# Patient Record
Sex: FEMALE | Race: WHITE | NOT HISPANIC OR LATINO | Employment: FULL TIME | URBAN - NONMETROPOLITAN AREA
[De-identification: names, ages, dates, MRNs, and addresses within clinical notes are randomized per-mention and may not be internally consistent; named-entity substitution may affect disease eponyms.]

---

## 2017-01-13 ENCOUNTER — OFFICE VISIT (OUTPATIENT)
Dept: CARDIOLOGY | Facility: CLINIC | Age: 47
End: 2017-01-13

## 2017-01-13 VITALS
WEIGHT: 230 LBS | HEIGHT: 72 IN | BODY MASS INDEX: 31.15 KG/M2 | SYSTOLIC BLOOD PRESSURE: 150 MMHG | DIASTOLIC BLOOD PRESSURE: 90 MMHG | HEART RATE: 104 BPM

## 2017-01-13 DIAGNOSIS — I10 ESSENTIAL HYPERTENSION: Primary | ICD-10-CM

## 2017-01-13 PROCEDURE — 99213 OFFICE O/P EST LOW 20 MIN: CPT | Performed by: INTERNAL MEDICINE

## 2017-01-13 RX ORDER — VALSARTAN AND HYDROCHLOROTHIAZIDE 320; 25 MG/1; MG/1
1 TABLET, FILM COATED ORAL DAILY
Qty: 90 TABLET | Refills: 3 | Status: SHIPPED | OUTPATIENT
Start: 2017-01-13 | End: 2018-02-03 | Stop reason: SDUPTHER

## 2017-01-13 NOTE — ASSESSMENT & PLAN NOTE
· Blood pressure remains elevated  · Will increase losartan/HCTZ to 1 tablet daily  · BMP in 2 weeks to evaluate creatinine and potassium level

## 2017-01-13 NOTE — LETTER
January 13, 2017     Alf Foreman MD  [address] [facility] [facility] [address]  [address]  [facility] [address]    Patient: Aleksandra Lew   YOB: 1970   Date of Visit: 1/13/2017     Dear Dr. Ahsan MD:    Thank you for referring Aleksandra Lew to me for evaluation. Below are the relevant portions of my assessment and plan of care.    If you have questions, please do not hesitate to call me. I look forward to following Aleksandra along with you.         Sincerely,        Francesco Fair MD        CC: No Recipients    Progress Notes:  Encounter Date:01/13/2017    Patient ID: Aleksandra Lew is a 46 y.o. female who resides in [place], [place].    CC/Reason for visit:  Follow-up (HTN, Palps) and Palpitations          Aleksandra Lew presents today as a follow up for hypertension and palpitations. Since last visit, the patient has been taking her valsartan HCTZ.  She's not been checking her blood pressure at home and does not know what her blood pressures up and running.  She recently went to [place] for the festivities of the [employer] football championship.  She denies any shortness of breath, angina, or palpitations.  Her lower extremity swelling has seemingly resolved itself.  She is not needed to take Lasix.      Review of Systems   Constitution: Negative for weakness and malaise/fatigue.   Eyes: Negative for vision loss in left eye and vision loss in right eye.   Cardiovascular: Positive for palpitations. Negative for chest pain, dyspnea on exertion, near-syncope, orthopnea, paroxysmal nocturnal dyspnea and syncope.   Musculoskeletal: Negative for myalgias.   Neurological: Negative for brief paralysis, excessive daytime sleepiness, focal weakness, numbness and paresthesias.   All other systems reviewed and are negative.      The patient's past medical, social, and family history reviewed in the patient's electronic medical record.    Allergies  Bisoprolol    Outpatient Prescriptions Marked as Taking for the 1/13/17 encounter (Office Visit)  "with Francesco Fair MD   Medication Sig Dispense Refill   • furosemide (LASIX) 20 MG tablet Take 2 tablets by mouth Daily. (Patient taking differently: Take 40 mg by mouth As Needed.) 180 tablet 3   • metoprolol succinate XL (TOPROL-XL) 25 MG 24 hr tablet Take 1 tablet by mouth Daily. 90 tablet 3   • potassium chloride ER (K-TAB) 20 MEQ tablet controlled-release ER tablet Take 1 tablet by mouth Daily. (Patient taking differently: Take 20 mEq by mouth As Needed.) 90 tablet 3   • valsartan-hydrochlorothiazide (DIOVAN-HCT) 320-25 MG per tablet Take 1 tablet by mouth Daily. 90 tablet 3   • [DISCONTINUED] valsartan-hydrochlorothiazide (DIOVAN-HCT) 320-25 MG per tablet Take 0.5 tablets by mouth Daily. 45 tablet 3         Blood pressure 150/90, pulse 104, height 72\" (182.9 cm), weight 230 lb (104 kg).  Body mass index is 31.19 kg/(m^2).    Physical Exam   Constitutional: She is oriented to person, place, and time. She appears well-developed and well-nourished.   HENT:   Head: Normocephalic and atraumatic.   Eyes: Pupils are equal, round, and reactive to light. No scleral icterus.   Neck: No JVD present. Carotid bruit is not present. No thyromegaly present.   Cardiovascular: Normal rate, regular rhythm, S1 normal and S2 normal.  Exam reveals no gallop.    No murmur heard.  Pulmonary/Chest: Effort normal and breath sounds normal.   Abdominal: Soft. There is no tenderness.   Neurological: She is alert and oriented to person, place, and time.   Skin: Skin is warm and dry. No cyanosis. Nails show no clubbing.   Psychiatric: She has a normal mood and affect. Her behavior is normal.       Data Review:   Procedures         Problem List Items Addressed This Visit        Cardiology Problems    Essential hypertension    Overview     · Diagnosed in 2006.  · Echocardiogram (7/31/2006): Normal LVEF, no significant valvular abnormality.  · MPS (7/28/2006):  No ischemia.          Relevant Medications    " valsartan-hydrochlorothiazide (DIOVAN-HCT) 320-25 MG per tablet    Other Relevant Orders    Basic Metabolic Panel    Chronic diastolic heart failure - Primary    Palpitations    Overview     · History of event recorder with no episodes, remote.                    · Increase valsartan HCTZ to 1 tablet daily  · Patient instructed to check blood pressures and report measurements to my office via [employer]  · BMP in 2 weeks  · Return to clinic in 6 months    Francesco Fair MD  1/13/2017     Scribed for Francesco Fair MD by Rubén Childers. 1/13/2017  9:57 AM

## 2017-01-13 NOTE — PROGRESS NOTES
Encounter Date:01/13/2017    Patient ID: Aleksandra Lew is a 46 y.o. female who resides in [place], [place].    CC/Reason for visit:  Follow-up (HTN, Palps) and Palpitations          Aleksandra Lew presents today as a follow up for hypertension and palpitations. Since last visit, the patient has been taking her valsartan HCTZ.  She's not been checking her blood pressure at home and does not know what her blood pressures up and running.  She recently went to [place] for the festivities of the [employer] football championship.  She denies any shortness of breath, angina, or palpitations.  Her lower extremity swelling has seemingly resolved itself.  She is not needed to take Lasix.      Review of Systems   Constitution: Negative for weakness and malaise/fatigue.   Eyes: Negative for vision loss in left eye and vision loss in right eye.   Cardiovascular: Positive for palpitations. Negative for chest pain, dyspnea on exertion, near-syncope, orthopnea, paroxysmal nocturnal dyspnea and syncope.   Musculoskeletal: Negative for myalgias.   Neurological: Negative for brief paralysis, excessive daytime sleepiness, focal weakness, numbness and paresthesias.   All other systems reviewed and are negative.      The patient's past medical, social, and family history reviewed in the patient's electronic medical record.    Allergies  Bisoprolol    Outpatient Prescriptions Marked as Taking for the 1/13/17 encounter (Office Visit) with Francesco Fair MD   Medication Sig Dispense Refill   • furosemide (LASIX) 20 MG tablet Take 2 tablets by mouth Daily. (Patient taking differently: Take 40 mg by mouth As Needed.) 180 tablet 3   • metoprolol succinate XL (TOPROL-XL) 25 MG 24 hr tablet Take 1 tablet by mouth Daily. 90 tablet 3   • potassium chloride ER (K-TAB) 20 MEQ tablet controlled-release ER tablet Take 1 tablet by mouth Daily. (Patient taking differently: Take 20 mEq by mouth As Needed.) 90 tablet 3   • valsartan-hydrochlorothiazide  "(DIOVAN-HCT) 320-25 MG per tablet Take 1 tablet by mouth Daily. 90 tablet 3   • [DISCONTINUED] valsartan-hydrochlorothiazide (DIOVAN-HCT) 320-25 MG per tablet Take 0.5 tablets by mouth Daily. 45 tablet 3         Blood pressure 150/90, pulse 104, height 72\" (182.9 cm), weight 230 lb (104 kg).  Body mass index is 31.19 kg/(m^2).    Physical Exam   Constitutional: She is oriented to person, place, and time. She appears well-developed and well-nourished.   HENT:   Head: Normocephalic and atraumatic.   Eyes: Pupils are equal, round, and reactive to light. No scleral icterus.   Neck: No JVD present. Carotid bruit is not present. No thyromegaly present.   Cardiovascular: Normal rate, regular rhythm, S1 normal and S2 normal.  Exam reveals no gallop.    No murmur heard.  Pulmonary/Chest: Effort normal and breath sounds normal.   Abdominal: Soft. There is no tenderness.   Neurological: She is alert and oriented to person, place, and time.   Skin: Skin is warm and dry. No cyanosis. Nails show no clubbing.   Psychiatric: She has a normal mood and affect. Her behavior is normal.       Data Review:   Procedures         Problem List Items Addressed This Visit        Cardiology Problems    Essential hypertension - Primary    Overview     · Diagnosed in 2006.  · Echocardiogram (7/31/2006): Normal LVEF, no significant valvular abnormality.  · MPS (7/28/2006):  No ischemia.          Current Assessment & Plan     · Blood pressure remains elevated  · Will increase losartan/HCTZ to 1 tablet daily  · BMP in 2 weeks to evaluate creatinine and potassium level         Relevant Medications    valsartan-hydrochlorothiazide (DIOVAN-HCT) 320-25 MG per tablet    Other Relevant Orders    Basic Metabolic Panel               · Increase valsartan HCTZ to 1 tablet daily  · Patient instructed to check blood pressures and report measurements to my office via [employer]  · BMP in 2 weeks  · Return to clinic in 6 months    Francesco Fair, " MD  1/13/2017     Scribed for Francesco Fair MD by Rubén Childers. 1/13/2017  10:01 AM

## 2017-01-13 NOTE — MR AVS SNAPSHOT
Aleksandra Louann   1/13/2017 9:45 AM   Office Visit    Dept Phone:  [phone]   Encounter #:  [id number]    Provider:  Francesco Fair MD   Department:  [facility] CARDIOLOGY                Your Full Care Plan              Today's Medication Changes          These changes are accurate as of: 1/13/17 10:00 AM.  If you have any questions, ask your nurse or doctor.               Medication(s)that have changed:     valsartan-hydrochlorothiazide 320-25 MG per tablet   Commonly known as:  DIOVAN-HCT   Take 1 tablet by mouth Daily.   What changed:  how much to take   Changed by:  Francesco Fair MD            Where to Get Your Medications      These medications were sent to [address] - [facility] - [phone]  - [phone] [address] [address]     Phone:  [phone]     valsartan-hydrochlorothiazide 320-25 MG per tablet                  Your Updated Medication List          This list is accurate as of: 1/13/17 10:00 AM.  Always use your most recent med list.                aspirin 81 MG tablet       furosemide 20 MG tablet   Commonly known as:  LASIX   Take 2 tablets by mouth Daily.       metoprolol succinate XL 25 MG 24 hr tablet   Commonly known as:  TOPROL-XL   Take 1 tablet by mouth Daily.       potassium chloride ER 20 MEQ tablet controlled-release ER tablet   Commonly known as:  K-TAB   Take 1 tablet by mouth Daily.       valsartan-hydrochlorothiazide 320-25 MG per tablet   Commonly known as:  DIOVAN-HCT   Take 1 tablet by mouth Daily.               You Were Diagnosed With        Codes Comments    Chronic diastolic heart failure    -  Primary ICD-10-CM: I50.32  ICD-9-CM: 428.32     Essential hypertension     ICD-10-CM: I10  ICD-9-CM: 401.9     Palpitations     ICD-10-CM: R00.2  ICD-9-CM: 785.1       Instructions     None    "Patient Instructions History      Upcoming Appointments     Visit Type Date Time Department    FOLLOW UP 1/13/2017  9:45 AM [facility]    FOLLOW UP 7/14/2017 10:45 AM [facility]      [facility] Signup     Our records indicate that you have an active [facility] Superb account.    You can view your After Visit Summary by going to [employer] and logging in with your Superb username and password.  If you don't have a Superb username and password but a parent or guardian has access to your record, the parent or guardian should login with their own Superb username and password and access your record to view the After Visit Summary.    If you have questions, you can email [email or web address] or call [phone] to talk to our Superb staff.  Remember, Superb is NOT to be used for urgent needs.  For medical emergencies, dial 911.               Other Info from Your Visit           Your Appointments     Jul 14, 2017 10:45 AM EDT   Follow Up with Francesco Fair MD   [facility] [place] [facility]ARDIOLOGY (--)    [address] [place] [address] [phone]           Arrive 15 minutes prior to appointment.              Allergies     Bisoprolol      DID NOT LIKE IT      Reason for Visit     Follow-up HTN, Palps    Palpitations           Vital Signs     Blood Pressure Pulse Height Weight Body Mass Index Smoking Status    150/90 (BP Location: Right arm) 104 72\" (182.9 cm) 230 lb (104 kg) 31.19 kg/m2 Never Smoker      Problems and Diagnoses Noted     Heart failure    High blood pressure    Palpitations        "

## 2017-01-27 ENCOUNTER — RESULTS ENCOUNTER (OUTPATIENT)
Dept: CARDIOLOGY | Facility: CLINIC | Age: 47
End: 2017-01-27

## 2017-01-27 DIAGNOSIS — I10 ESSENTIAL HYPERTENSION: ICD-10-CM

## 2017-02-08 DIAGNOSIS — Z79.899 HIGH RISK MEDICATION USE: Primary | ICD-10-CM

## 2017-11-16 DIAGNOSIS — I10 ESSENTIAL HYPERTENSION: ICD-10-CM

## 2017-11-16 DIAGNOSIS — R00.2 RAPID PALPITATIONS: ICD-10-CM

## 2017-11-16 RX ORDER — METOPROLOL SUCCINATE 25 MG/1
TABLET, EXTENDED RELEASE ORAL
Qty: 90 TABLET | Refills: 0 | Status: SHIPPED | OUTPATIENT
Start: 2017-11-16 | End: 2018-08-01 | Stop reason: SDUPTHER

## 2018-02-03 DIAGNOSIS — I10 ESSENTIAL HYPERTENSION: ICD-10-CM

## 2018-02-05 RX ORDER — VALSARTAN AND HYDROCHLOROTHIAZIDE 320; 25 MG/1; MG/1
TABLET, FILM COATED ORAL
Qty: 90 TABLET | Refills: 0 | Status: SHIPPED | OUTPATIENT
Start: 2018-02-05 | End: 2018-08-01 | Stop reason: SDUPTHER

## 2018-03-08 ENCOUNTER — TRANSCRIBE ORDERS (OUTPATIENT)
Dept: OBSTETRICS AND GYNECOLOGY | Facility: CLINIC | Age: 48
End: 2018-03-08

## 2018-03-08 DIAGNOSIS — Z12.31 VISIT FOR SCREENING MAMMOGRAM: Primary | ICD-10-CM

## 2018-03-27 ENCOUNTER — HOSPITAL ENCOUNTER (OUTPATIENT)
Dept: MAMMOGRAPHY | Facility: HOSPITAL | Age: 48
Discharge: HOME | End: 2018-03-27
Attending: OBSTETRICS & GYNECOLOGY | Admitting: OBSTETRICS & GYNECOLOGY

## 2018-03-27 DIAGNOSIS — Z12.31 VISIT FOR SCREENING MAMMOGRAM: ICD-10-CM

## 2018-03-27 PROCEDURE — 77063 BREAST TOMOSYNTHESIS BI: CPT

## 2018-03-27 PROCEDURE — 77067 SCR MAMMO BI INCL CAD: CPT | Performed by: RADIOLOGY

## 2018-03-27 PROCEDURE — 77067 SCR MAMMO BI INCL CAD: CPT

## 2018-03-27 PROCEDURE — 77063 BREAST TOMOSYNTHESIS BI: CPT | Performed by: RADIOLOGY

## 2018-04-02 ENCOUNTER — HOSPITAL ENCOUNTER (OUTPATIENT)
Dept: ULTRASOUND IMAGING | Facility: HOSPITAL | Age: 48
Discharge: HOME | End: 2018-04-02
Admitting: OBSTETRICS & GYNECOLOGY

## 2018-04-02 DIAGNOSIS — R92.8 ABNORMAL MAMMOGRAM: ICD-10-CM

## 2018-04-02 PROCEDURE — 76642 ULTRASOUND BREAST LIMITED: CPT | Performed by: RADIOLOGY

## 2018-04-02 PROCEDURE — 76642 ULTRASOUND BREAST LIMITED: CPT

## 2018-08-01 DIAGNOSIS — R00.2 RAPID PALPITATIONS: ICD-10-CM

## 2018-08-01 DIAGNOSIS — I10 ESSENTIAL HYPERTENSION: ICD-10-CM

## 2018-08-01 RX ORDER — METOPROLOL SUCCINATE 25 MG/1
25 TABLET, EXTENDED RELEASE ORAL DAILY
Qty: 90 TABLET | Refills: 0 | Status: SHIPPED | OUTPATIENT
Start: 2018-08-01 | End: 2019-08-02 | Stop reason: SDDI

## 2018-08-01 RX ORDER — VALSARTAN AND HYDROCHLOROTHIAZIDE 320; 25 MG/1; MG/1
1 TABLET, FILM COATED ORAL DAILY
Qty: 90 TABLET | Refills: 0 | Status: SHIPPED | OUTPATIENT
Start: 2018-08-01 | End: 2019-08-02 | Stop reason: SDDI

## 2018-08-06 DIAGNOSIS — Z79.899 HIGH RISK MEDICATION USE: Primary | ICD-10-CM

## 2018-08-07 ENCOUNTER — LAB (OUTPATIENT)
Dept: LAB | Facility: HOSPITAL | Age: 48
End: 2018-08-07

## 2018-08-07 DIAGNOSIS — Z79.899 HIGH RISK MEDICATION USE: ICD-10-CM

## 2018-08-07 LAB
ANION GAP SERPL CALCULATED.3IONS-SCNC: 7 MMOL/L (ref 3–11)
BUN BLD-MCNC: 12 MG/DL (ref 9–23)
BUN/CREAT SERPL: 11.2 (ref 7–25)
CALCIUM SPEC-SCNC: 9.1 MG/DL (ref 8.7–10.4)
CHLORIDE SERPL-SCNC: 105 MMOL/L (ref 99–109)
CO2 SERPL-SCNC: 26 MMOL/L (ref 20–31)
CREAT BLD-MCNC: 1.07 MG/DL (ref 0.6–1.3)
GFR SERPL CREATININE-BSD FRML MDRD: 55 ML/MIN/1.73
GLUCOSE BLD-MCNC: 108 MG/DL (ref 70–100)
POTASSIUM BLD-SCNC: 4.5 MMOL/L (ref 3.5–5.5)
SODIUM BLD-SCNC: 138 MMOL/L (ref 132–146)

## 2018-08-07 PROCEDURE — 80048 BASIC METABOLIC PNL TOTAL CA: CPT

## 2019-07-26 ENCOUNTER — OUTSIDE FACILITY SERVICE (OUTPATIENT)
Dept: GASTROENTEROLOGY | Facility: CLINIC | Age: 49
End: 2019-07-26

## 2019-07-26 ENCOUNTER — LAB REQUISITION (OUTPATIENT)
Dept: LAB | Facility: HOSPITAL | Age: 49
End: 2019-07-26

## 2019-07-26 ENCOUNTER — TELEPHONE (OUTPATIENT)
Dept: GASTROENTEROLOGY | Facility: CLINIC | Age: 49
End: 2019-07-26

## 2019-07-26 DIAGNOSIS — R10.13 EPIGASTRIC PAIN: ICD-10-CM

## 2019-07-26 DIAGNOSIS — R11.0 NAUSEA: ICD-10-CM

## 2019-07-26 PROCEDURE — 43248 EGD GUIDE WIRE INSERTION: CPT | Performed by: INTERNAL MEDICINE

## 2019-07-26 PROCEDURE — 88342 IMHCHEM/IMCYTCHM 1ST ANTB: CPT | Performed by: INTERNAL MEDICINE

## 2019-07-26 PROCEDURE — 43239 EGD BIOPSY SINGLE/MULTIPLE: CPT | Performed by: INTERNAL MEDICINE

## 2019-07-26 PROCEDURE — 88305 TISSUE EXAM BY PATHOLOGIST: CPT | Performed by: INTERNAL MEDICINE

## 2019-07-26 PROCEDURE — 99204 OFFICE O/P NEW MOD 45 MIN: CPT | Performed by: INTERNAL MEDICINE

## 2019-07-26 NOTE — TELEPHONE ENCOUNTER
Called in [facility] :    2% lidocaine viscus 60ML    Mylanta 180 ML    Bentyl 120 ML    Total volume 360 ML    Take 30 ML every 4-6 hours as needed    #1 and 0 refills

## 2019-08-02 ENCOUNTER — TRANSCRIBE ORDERS (OUTPATIENT)
Dept: ADMINISTRATIVE | Facility: HOSPITAL | Age: 49
End: 2019-08-02

## 2019-08-02 ENCOUNTER — APPOINTMENT (OUTPATIENT)
Dept: LAB | Facility: HOSPITAL | Age: 49
End: 2019-08-02

## 2019-08-02 ENCOUNTER — OFFICE VISIT (OUTPATIENT)
Dept: FAMILY MEDICINE CLINIC | Facility: CLINIC | Age: 49
End: 2019-08-02

## 2019-08-02 VITALS
DIASTOLIC BLOOD PRESSURE: 84 MMHG | WEIGHT: 232 LBS | SYSTOLIC BLOOD PRESSURE: 140 MMHG | BODY MASS INDEX: 31.42 KG/M2 | OXYGEN SATURATION: 98 % | HEIGHT: 72 IN | HEART RATE: 100 BPM

## 2019-08-02 DIAGNOSIS — Z12.31 VISIT FOR SCREENING MAMMOGRAM: Primary | ICD-10-CM

## 2019-08-02 DIAGNOSIS — I10 ESSENTIAL HYPERTENSION: Primary | ICD-10-CM

## 2019-08-02 DIAGNOSIS — Z00.00 PREVENTATIVE HEALTH CARE: ICD-10-CM

## 2019-08-02 DIAGNOSIS — E66.09 CLASS 1 OBESITY DUE TO EXCESS CALORIES WITH SERIOUS COMORBIDITY AND BODY MASS INDEX (BMI) OF 31.0 TO 31.9 IN ADULT: Chronic | ICD-10-CM

## 2019-08-02 DIAGNOSIS — I47.9 PAROXYSMAL TACHYCARDIA (HCC): ICD-10-CM

## 2019-08-02 LAB
ALBUMIN SERPL-MCNC: 4.5 G/DL (ref 3.5–5.2)
ALBUMIN UR-MCNC: <1.2 MG/DL
ALBUMIN/GLOB SERPL: 1.8 G/DL
ALP SERPL-CCNC: 54 U/L (ref 39–117)
ALT SERPL W P-5'-P-CCNC: 20 U/L (ref 1–33)
ANION GAP SERPL CALCULATED.3IONS-SCNC: 14.4 MMOL/L (ref 5–15)
AST SERPL-CCNC: 17 U/L (ref 1–32)
BACTERIA UR QL AUTO: ABNORMAL /HPF
BASOPHILS # BLD AUTO: 0.04 10*3/MM3 (ref 0–0.2)
BASOPHILS NFR BLD AUTO: 0.4 % (ref 0–1.5)
BILIRUB SERPL-MCNC: 0.4 MG/DL (ref 0.2–1.2)
BILIRUB UR QL STRIP: NEGATIVE
BUN BLD-MCNC: 10 MG/DL (ref 6–20)
BUN/CREAT SERPL: 10.6 (ref 7–25)
CALCIUM SPEC-SCNC: 9.7 MG/DL (ref 8.6–10.5)
CHLORIDE SERPL-SCNC: 105 MMOL/L (ref 98–107)
CHOLEST SERPL-MCNC: 160 MG/DL (ref 0–200)
CLARITY UR: ABNORMAL
CO2 SERPL-SCNC: 21.6 MMOL/L (ref 22–29)
COD CRY URNS QL: ABNORMAL /HPF
COLOR UR: YELLOW
CREAT BLD-MCNC: 0.94 MG/DL (ref 0.57–1)
CREAT UR-MCNC: 157.6 MG/DL
CYTO UR: NORMAL
DEPRECATED RDW RBC AUTO: 45.9 FL (ref 37–54)
EOSINOPHIL # BLD AUTO: 0.21 10*3/MM3 (ref 0–0.4)
EOSINOPHIL NFR BLD AUTO: 2.3 % (ref 0.3–6.2)
ERYTHROCYTE [DISTWIDTH] IN BLOOD BY AUTOMATED COUNT: 13.4 % (ref 12.3–15.4)
GFR SERPL CREATININE-BSD FRML MDRD: 64 ML/MIN/1.73
GLOBULIN UR ELPH-MCNC: 2.5 GM/DL
GLUCOSE BLD-MCNC: 87 MG/DL (ref 65–99)
GLUCOSE UR STRIP-MCNC: NEGATIVE MG/DL
HBA1C MFR BLD: 5.56 % (ref 4.8–5.6)
HCT VFR BLD AUTO: 52.5 % (ref 34–46.6)
HDLC SERPL-MCNC: 34 MG/DL (ref 40–60)
HGB BLD-MCNC: 15.9 G/DL (ref 12–15.9)
HGB UR QL STRIP.AUTO: NEGATIVE
HYALINE CASTS UR QL AUTO: ABNORMAL /LPF
IMM GRANULOCYTES # BLD AUTO: 0.02 10*3/MM3 (ref 0–0.05)
IMM GRANULOCYTES NFR BLD AUTO: 0.2 % (ref 0–0.5)
KETONES UR QL STRIP: NEGATIVE
LAB AP CASE REPORT: NORMAL
LAB AP CLINICAL INFORMATION: NORMAL
LDLC SERPL CALC-MCNC: 105 MG/DL (ref 0–100)
LDLC/HDLC SERPL: 3.09 {RATIO}
LEUKOCYTE ESTERASE UR QL STRIP.AUTO: ABNORMAL
LYMPHOCYTES # BLD AUTO: 1.46 10*3/MM3 (ref 0.7–3.1)
LYMPHOCYTES NFR BLD AUTO: 15.8 % (ref 19.6–45.3)
MCH RBC QN AUTO: 27.9 PG (ref 26.6–33)
MCHC RBC AUTO-ENTMCNC: 30.3 G/DL (ref 31.5–35.7)
MCV RBC AUTO: 92.3 FL (ref 79–97)
MICROALBUMIN/CREAT UR: NORMAL MG/G
MONOCYTES # BLD AUTO: 0.57 10*3/MM3 (ref 0.1–0.9)
MONOCYTES NFR BLD AUTO: 6.2 % (ref 5–12)
NEUTROPHILS # BLD AUTO: 6.93 10*3/MM3 (ref 1.7–7)
NEUTROPHILS NFR BLD AUTO: 75.1 % (ref 42.7–76)
NITRITE UR QL STRIP: NEGATIVE
NRBC BLD AUTO-RTO: 0 /100 WBC (ref 0–0.2)
PATH REPORT.FINAL DX SPEC: NORMAL
PATH REPORT.GROSS SPEC: NORMAL
PH UR STRIP.AUTO: 5.5 [PH] (ref 5–8)
PLATELET # BLD AUTO: 285 10*3/MM3 (ref 140–450)
PMV BLD AUTO: 10.8 FL (ref 6–12)
POTASSIUM BLD-SCNC: 4.7 MMOL/L (ref 3.5–5.2)
PROT SERPL-MCNC: 7 G/DL (ref 6–8.5)
PROT UR QL STRIP: NEGATIVE
RBC # BLD AUTO: 5.69 10*6/MM3 (ref 3.77–5.28)
RBC # UR: ABNORMAL /HPF
REF LAB TEST METHOD: ABNORMAL
SODIUM BLD-SCNC: 141 MMOL/L (ref 136–145)
SP GR UR STRIP: 1.03 (ref 1–1.03)
SQUAMOUS #/AREA URNS HPF: ABNORMAL /HPF
TRIGL SERPL-MCNC: 104 MG/DL (ref 0–150)
TSH SERPL DL<=0.05 MIU/L-ACNC: 1.87 MIU/ML (ref 0.27–4.2)
UROBILINOGEN UR QL STRIP: ABNORMAL
VLDLC SERPL-MCNC: 20.8 MG/DL (ref 5–40)
WBC NRBC COR # BLD: 9.23 10*3/MM3 (ref 3.4–10.8)
WBC UR QL AUTO: ABNORMAL /HPF

## 2019-08-02 PROCEDURE — 84443 ASSAY THYROID STIM HORMONE: CPT | Performed by: FAMILY MEDICINE

## 2019-08-02 PROCEDURE — 85025 COMPLETE CBC W/AUTO DIFF WBC: CPT | Performed by: FAMILY MEDICINE

## 2019-08-02 PROCEDURE — 99202 OFFICE O/P NEW SF 15 MIN: CPT | Performed by: FAMILY MEDICINE

## 2019-08-02 PROCEDURE — 82570 ASSAY OF URINE CREATININE: CPT | Performed by: FAMILY MEDICINE

## 2019-08-02 PROCEDURE — 83036 HEMOGLOBIN GLYCOSYLATED A1C: CPT | Performed by: FAMILY MEDICINE

## 2019-08-02 PROCEDURE — 82043 UR ALBUMIN QUANTITATIVE: CPT | Performed by: FAMILY MEDICINE

## 2019-08-02 PROCEDURE — 80061 LIPID PANEL: CPT | Performed by: FAMILY MEDICINE

## 2019-08-02 PROCEDURE — 80053 COMPREHEN METABOLIC PANEL: CPT | Performed by: FAMILY MEDICINE

## 2019-08-02 PROCEDURE — 81001 URINALYSIS AUTO W/SCOPE: CPT | Performed by: FAMILY MEDICINE

## 2019-08-02 NOTE — PROGRESS NOTES
Subjective   Aleksandra Stock is a 48 y.o. female.     Chief Complaint   Patient presents with   • Establish Care       History of Present Illness     Previous primary care: None    Chronic health conditions:  HTN- Poorly controlled  Previous diagnosis of heart failure    Other physicians currently involved in patient's care:  Had EGD done with Dr. Mcclain last Friday    Acute complaints:  Aleksandra Stock is a 48 y.o. female who presents today to establish care.     Palpitations: Started when she saw Dr. Armenta years ago, now happening maybe 3x per year. Very sporadic and random. 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    The following portions of the patient's history were reviewed and updated as appropriate: allergies, current medications, past family history, past medical history, past social history, past surgical history and problem list.    Active Ambulatory Problems     Diagnosis Date Noted   • Essential hypertension 05/19/2015   • Obesity 05/19/2015   • Peptic ulcer disease 05/19/2015   • Bilateral edema of lower extremity 10/12/2016   • Chronic diastolic heart failure (CMS/HCC) 10/13/2016     Resolved Ambulatory Problems     Diagnosis Date Noted   • Palpitations 05/19/2015     Past Medical History:   Diagnosis Date   • Asthma    • GERD (gastroesophageal reflux disease)    • Hypertension 01/01/2006   • Obesity    • Peptic ulcer disease      History reviewed. No pertinent surgical history.  Family History   Problem Relation Age of Onset   • Breast cancer Maternal Grandmother         DX AGE 50's   • Breast cancer Maternal Aunt 48   • Breast cancer Maternal Aunt 49   • Breast cancer Maternal Aunt 54   • No Known Problems Mother    • Heart attack Father 53   • Heart attack Paternal Aunt    • Heart attack Paternal Grandmother    • Heart attack Paternal Aunt    • Heart attack Paternal Aunt    • Ovarian cancer Neg Hx      Social History     Socioeconomic History   • Marital status: Single     " Spouse name: Not on file   • Number of children: Not on file   • Years of education: Not on file   • Highest education level: Not on file   Tobacco Use   • Smoking status: Never Smoker   • Smokeless tobacco: Never Used   Substance and Sexual Activity   • Alcohol use: No   • Drug use: No   • Sexual activity: Defer         Review of Systems   Constitutional: Positive for fatigue.   Respiratory: Negative.    Cardiovascular: Positive for palpitations. Negative for chest pain and leg swelling.   Gastrointestinal:        Recent egd       Objective   Blood pressure 140/84, pulse 100, height 182.9 cm (72\"), weight 105 kg (232 lb), SpO2 98 %.  Nursing note reviewed  Physical Exam  Const: NAD, A&Ox4, Pleasant, Cooperative  Eyes: EOMI, no conjunctivitis  ENT: No nasal discharge present, neck supple  Cardiac: Regular rate and rhythm, no cyanosis  Resp: Respiratory rate within normal limits, no increased work of breathing, no audible wheezing or retractions noted  GI: No distention or ascites  MSK: Motor and sensation grossly intact in bilateral upper extremities  Neurologic: CN II-XII grossly intact  Psych: Appropriate mood and behavior.  Skin: Pink, warm, dry  Procedures  Assessment/Plan   Aleksandra was seen today for establish care.    Diagnoses and all orders for this visit:    Essential hypertension  -     Comprehensive Metabolic Panel; Future  -     Lipid Panel; Future  -     Microalbumin / Creatinine Urine Ratio - Urine, Clean Catch; Future  -     Urinalysis With Microscopic If Indicated (No Culture) - Urine, Clean Catch; Future  -     Hemoglobin A1c; Future    Paroxysmal tachycardia (CMS/HCC)  -     CBC & Differential; Future  -     TSH; Future    Class 1 obesity due to excess calories with serious comorbidity and body mass index (BMI) of 31.0 to 31.9 in adult    Preventative health care  -     Comprehensive Metabolic Panel; Future  -     CBC & Differential; Future  -     Lipid Panel; Future  -     TSH; Future  -     " Microalbumin / Creatinine Urine Ratio - Urine, Clean Catch; Future  -     Urinalysis With Microscopic If Indicated (No Culture) - Urine, Clean Catch; Future  -     Hemoglobin A1c; Future        Acute concerns:  #1  hypertension  Elevated today 140/84, not high enough that it requires urgent treatment with medication.  She has previously been on valsartan-HCTZ.  Most important for her ongoing Control of her blood pressure will be weight loss.  Beta-blockade has previously made her too fatigued to exercise and resulted in significant weight gain.  She has lost about 20 pounds since going off these medications.  -Labs today, follow-up in 6 weeks    #2 intermittent palpitations  Sounds like paroxysmal SVT, we discussed Valsalva maneuvers.  She has previously worn an event monitor but has never been able to capture any.  I did tell her about cardio mobile, and iPhone galo that may be able to help cord her rhythm when it happens.  -Due to the infrequency of her symptoms daily medication is not indicated, she may be a candidate for flecainide as needed    #3 obesity  She has previously been on phentermine, however Dr. Armenta took her off of this due to the hypertension and palpitations.  Certainly if the phentermine increases the frequency of her palpitations I would avoid this, however in terms of her blood pressure weight loss of 30 to 40 pounds over the next 1 year would be far better for her long-term health and blood pressure and I believe would outweigh the risks of phentermine treatment.  -Other options include Contrave, Saxenda  -Counseled extensively on weight loss as below    We will plan to obtain previous records both for chronic preventative care as well as those related to the current episode of care.  Any records that the patient brought with her today were reviewed personally by me during the visit today and will be scanned into the chart for posterity.    Patient Instructions   Weight Loss Tips and  "Recommendations:    For weight loss, weigh yourself at most every 1-2 weeks, on an empty stomach in the morning (or at least not within 8 hours of a heavy meal). It is also helpful to weigh yourself in the same clothes every time -- this might even include for every time you come to see me! For your goals, consider effort-based rather than results: instead of aiming for 4lbs per month, try to keep a log of your food and eat 3908-8587 calories per day. If you stick to this amount, regardless of what the scale fluctuates to, I can guarantee that you will lose weight in the form of fat. One pound of fat is approximately 3500 calories, which means cutting 500 calories per day will result in a full pound of pure fat loss. Continue to try to exercise and walk as much as possible. Fidgeting, as much as we're taught not to, can burn an extra 100-200 calories per day as well! If you stress-eat and occasionally eat impulsively, always make sure to keep a healthy snack and water on hand for when these impulses strike. A handful of almonds and 8 ounces of water are a great low-calorie, high-protein combination that controls hunger well.    If your diet includes salads, try for small changes such as swapping spinach or kale for fawn or iceberg lettuce. Try to choose dressings that are either low in fat or that have a high ratio of unsaturated to saturated fats. These include ones with olive oil rather than soybean oil bases.    While the most helpful tips I have found for weight loss is setting discrete, achievable, actionable goals. This means focusing on specific successes that are within your control, rather than short-term results.  Just remember, that if you do the right things, do them consistently, and do them for the long-term, you will lose weight.  Make sure your goals are \"ADA\" compliant--Achievable, Discrete, and Actionable:  Achievable: It is important to be realistic.  Setting unrealistic expectations not only " "setting up for failure, but can even be unhealthy.  Start small with changes that will be sustainable over the long-term.  Remember, how you eat and exercise to get to a certain weight is how you'll need to eat and exercise to stay at that weight.  Discrete: Rather than say \"I want to eat better\", which is vague and noncommittal, make your goal something like \"I want to eat 3 servings of vegetables every day\" or \"I want to keep every meal below 600 stanley\"  Actionable: Actual weight makes small fluctuations on a daily basis, impacted by things such as water retention or colonic waste retention.  For the most part, these things are out of our control.  It can be easy to do everything right but then have a little fluid retention or constipation, and suddenly despite all your efforts your weight remains the same. So instead of trying to lose say 2 pounds per week, make your goals only dependent on what you do--\"I want to walk 10,000 steps per day\" or \"I want to eat below 1500 stanley per day.\"  That way he can have weekly successes and don't get to down on herself for things that you don't control.    If you remember, bring your food diary to your next appointment.  If you have a smart phone, I recommend the [employer] galo for food tracking.  It includes a diary and data base of most fast food and restaurants, and even has a barcode scanner that will automatically log a food for you.  If you have a notebook for this purpose (such as from [employer] or [employer]), you can also write down a section for questions that pop up for me over the next few months.    Online Weight Loss Calculator:  https://[email or web address]/[email or web address]/resources/weight-loss-calculator    If you have any questions do not hesitate to call the office.      Return in about 6 weeks (around 9/13/2019).    Ambulatory progress note signed and attested to by Preston Flores D.O.           "

## 2019-08-02 NOTE — PATIENT INSTRUCTIONS
"Weight Loss Tips and Recommendations:    For weight loss, weigh yourself at most every 1-2 weeks, on an empty stomach in the morning (or at least not within 8 hours of a heavy meal). It is also helpful to weigh yourself in the same clothes every time -- this might even include for every time you come to see me! For your goals, consider effort-based rather than results: instead of aiming for 4lbs per month, try to keep a log of your food and eat 8600-4506 calories per day. If you stick to this amount, regardless of what the scale fluctuates to, I can guarantee that you will lose weight in the form of fat. One pound of fat is approximately 3500 calories, which means cutting 500 calories per day will result in a full pound of pure fat loss. Continue to try to exercise and walk as much as possible. Fidgeting, as much as we're taught not to, can burn an extra 100-200 calories per day as well! If you stress-eat and occasionally eat impulsively, always make sure to keep a healthy snack and water on hand for when these impulses strike. A handful of almonds and 8 ounces of water are a great low-calorie, high-protein combination that controls hunger well.    If your diet includes salads, try for small changes such as swapping spinach or kale for fawn or iceberg lettuce. Try to choose dressings that are either low in fat or that have a high ratio of unsaturated to saturated fats. These include ones with olive oil rather than soybean oil bases.    While the most helpful tips I have found for weight loss is setting discrete, achievable, actionable goals. This means focusing on specific successes that are within your control, rather than short-term results.  Just remember, that if you do the right things, do them consistently, and do them for the long-term, you will lose weight.  Make sure your goals are \"ADA\" compliant--Achievable, Discrete, and Actionable:  Achievable: It is important to be realistic.  Setting unrealistic " "expectations not only setting up for failure, but can even be unhealthy.  Start small with changes that will be sustainable over the long-term.  Remember, how you eat and exercise to get to a certain weight is how you'll need to eat and exercise to stay at that weight.  Discrete: Rather than say \"I want to eat better\", which is vague and noncommittal, make your goal something like \"I want to eat 3 servings of vegetables every day\" or \"I want to keep every meal below 600 stanley\"  Actionable: Actual weight makes small fluctuations on a daily basis, impacted by things such as water retention or colonic waste retention.  For the most part, these things are out of our control.  It can be easy to do everything right but then have a little fluid retention or constipation, and suddenly despite all your efforts your weight remains the same. So instead of trying to lose say 2 pounds per week, make your goals only dependent on what you do--\"I want to walk 10,000 steps per day\" or \"I want to eat below 1500 stanley per day.\"  That way he can have weekly successes and don't get to down on herself for things that you don't control.    If you remember, bring your food diary to your next appointment.  If you have a smart phone, I recommend the [employer] galo for food tracking.  It includes a diary and data base of most fast food and restaurants, and even has a barcode scanner that will automatically log a food for you.  If you have a notebook for this purpose (such as from [employer] or [employer]), you can also write down a section for questions that pop up for me over the next few months.    Online Weight Loss Calculator:  [email or web address]://[email or web address]-[email or web address]-calculator    If you have any questions do not hesitate to call the office.  "

## 2019-09-19 ENCOUNTER — HOSPITAL ENCOUNTER (OUTPATIENT)
Dept: MAMMOGRAPHY | Facility: HOSPITAL | Age: 49
Discharge: HOME | End: 2019-09-19
Admitting: FAMILY MEDICINE

## 2019-09-19 DIAGNOSIS — Z12.31 VISIT FOR SCREENING MAMMOGRAM: ICD-10-CM

## 2019-09-19 PROCEDURE — 77063 BREAST TOMOSYNTHESIS BI: CPT

## 2019-09-19 PROCEDURE — 77063 BREAST TOMOSYNTHESIS BI: CPT | Performed by: RADIOLOGY

## 2019-09-19 PROCEDURE — 77067 SCR MAMMO BI INCL CAD: CPT

## 2019-09-19 PROCEDURE — 77067 SCR MAMMO BI INCL CAD: CPT | Performed by: RADIOLOGY

## 2021-10-21 ENCOUNTER — TRANSCRIBE ORDERS (OUTPATIENT)
Dept: ADMINISTRATIVE | Facility: HOSPITAL | Age: 51
End: 2021-10-21

## 2021-10-21 DIAGNOSIS — Z12.31 VISIT FOR SCREENING MAMMOGRAM: Primary | ICD-10-CM

## 2021-11-24 ENCOUNTER — HOSPITAL ENCOUNTER (OUTPATIENT)
Dept: MAMMOGRAPHY | Facility: HOSPITAL | Age: 51
Discharge: HOME | End: 2021-11-24
Admitting: FAMILY MEDICINE

## 2021-11-24 DIAGNOSIS — Z12.31 VISIT FOR SCREENING MAMMOGRAM: ICD-10-CM

## 2021-11-24 PROCEDURE — 77063 BREAST TOMOSYNTHESIS BI: CPT | Performed by: RADIOLOGY

## 2021-11-24 PROCEDURE — 77063 BREAST TOMOSYNTHESIS BI: CPT

## 2021-11-24 PROCEDURE — 77067 SCR MAMMO BI INCL CAD: CPT

## 2021-11-24 PROCEDURE — 77067 SCR MAMMO BI INCL CAD: CPT | Performed by: RADIOLOGY

## 2022-10-11 ENCOUNTER — TRANSCRIBE ORDERS (OUTPATIENT)
Dept: ADMINISTRATIVE | Facility: HOSPITAL | Age: 52
End: 2022-10-11

## 2022-10-11 DIAGNOSIS — Z12.31 ENCOUNTER FOR SCREENING MAMMOGRAM FOR MALIGNANT NEOPLASM OF BREAST: Primary | ICD-10-CM

## 2022-11-28 ENCOUNTER — HOSPITAL ENCOUNTER (OUTPATIENT)
Dept: MAMMOGRAPHY | Facility: HOSPITAL | Age: 52
Discharge: HOME | End: 2022-11-28
Admitting: FAMILY MEDICINE

## 2022-11-28 DIAGNOSIS — Z12.31 ENCOUNTER FOR SCREENING MAMMOGRAM FOR MALIGNANT NEOPLASM OF BREAST: ICD-10-CM

## 2022-11-28 PROCEDURE — 77063 BREAST TOMOSYNTHESIS BI: CPT

## 2022-11-28 PROCEDURE — 77067 SCR MAMMO BI INCL CAD: CPT | Performed by: RADIOLOGY

## 2022-11-28 PROCEDURE — 77067 SCR MAMMO BI INCL CAD: CPT

## 2022-11-28 PROCEDURE — 77063 BREAST TOMOSYNTHESIS BI: CPT | Performed by: RADIOLOGY

## 2023-03-21 ENCOUNTER — HOSPITAL ENCOUNTER (OUTPATIENT)
Dept: GENERAL RADIOLOGY | Facility: HOSPITAL | Age: 53
Discharge: HOME | End: 2023-03-21
Admitting: NURSE PRACTITIONER
Payer: COMMERCIAL

## 2023-03-21 ENCOUNTER — TRANSCRIBE ORDERS (OUTPATIENT)
Dept: ADMINISTRATIVE | Facility: HOSPITAL | Age: 53
End: 2023-03-21
Payer: COMMERCIAL

## 2023-03-21 DIAGNOSIS — R00.2 HEART PALPITATIONS: Primary | ICD-10-CM

## 2023-03-21 DIAGNOSIS — R00.2 HEART PALPITATIONS: ICD-10-CM

## 2023-03-21 PROCEDURE — 71046 X-RAY EXAM CHEST 2 VIEWS: CPT

## 2023-11-15 ENCOUNTER — TRANSCRIBE ORDERS (OUTPATIENT)
Dept: ADMINISTRATIVE | Facility: HOSPITAL | Age: 53
End: 2023-11-15
Payer: COMMERCIAL

## 2023-11-15 DIAGNOSIS — Z12.31 SCREENING MAMMOGRAM FOR BREAST CANCER: Primary | ICD-10-CM

## 2024-01-10 ENCOUNTER — HOSPITAL ENCOUNTER (OUTPATIENT)
Dept: MAMMOGRAPHY | Facility: HOSPITAL | Age: 54
Discharge: HOME | End: 2024-01-10
Admitting: NURSE PRACTITIONER
Payer: COMMERCIAL

## 2024-01-10 DIAGNOSIS — Z12.31 SCREENING MAMMOGRAM FOR BREAST CANCER: ICD-10-CM

## 2024-01-10 PROCEDURE — 77067 SCR MAMMO BI INCL CAD: CPT

## 2024-01-10 PROCEDURE — 77063 BREAST TOMOSYNTHESIS BI: CPT

## 2024-02-22 NOTE — PROGRESS NOTES
"[facility] at [facility]  Cardiology Consultation Note     Aleksandra Stock  1970  Requesting Provider: No ref. provider found  PCP: Latesha Martinez APRN    ID:  Aleksandra Stock is a 53 y.o. female who resides in [place], [place].     REASON FOR CONSULTATION:    Abnormal EKG         Dear Latesha Dorman:    Thank you for referring Cristina Stock back to my office for a reevaluation.  Someone in your office last year referred her back to me for palpitation symptoms which have been evaluated in years past.  She never made that appointment, but states her palpitation symptoms have resolved.    You recently saw her in the office and EKG was performed and was reportedly \"abnormal\".  My office has attempted to obtain this EKG with no success.  EKG performed in your office 3/21/2023 appears unremarkable.  EKG today is normal.    Patient presently has no cardiovascular complaints.    Patient reminds me that her father had bypass surgery in his late 40s.      Past Medical History, Past Surgical History, Family history, Social History, and Medications were all reviewed with the patient today and updated as necessary.       Current Outpatient Medications:     atorvastatin (LIPITOR) 10 MG tablet, Take 1 tablet by mouth Daily., Disp: , Rfl:     hydroCHLOROthiazide 12.5 MG tablet, Take 1 tablet by mouth Daily., Disp: , Rfl:     olmesartan (BENICAR) 20 MG tablet, Take 1 tablet by mouth Daily., Disp: , Rfl:     Allergies   Allergen Reactions    Bisoprolol      DID NOT LIKE IT         Past Medical History:   Diagnosis Date    Asthma     Breast injury 08/2021    BRUISED BOTH BREASTS S/P FALL    GERD (gastroesophageal reflux disease)     Hypertension 01/01/2006    ECHOCARDIOGRAM 07/31/2006, MPS 07/28/2006    Obesity     Peptic ulcer disease 1990       History reviewed. No pertinent surgical history.    Family History   Problem Relation Age of Onset    No Known Problems Mother     Heart attack " "Father 53    Breast cancer Maternal Grandmother         DX AGE 50's    Heart attack Paternal Grandmother     Ovarian cancer Maternal Aunt     Breast cancer Maternal Aunt 48    Breast cancer Maternal Aunt 49    Breast cancer Maternal Aunt 54    Heart attack Paternal Aunt     Heart attack Paternal Aunt     Heart attack Paternal Aunt        Social History     Tobacco Use    Smoking status: Never    Smokeless tobacco: Never   Substance Use Topics    Alcohol use: No       Review of Systems   Cardiovascular: Negative.    Respiratory: Negative.                 /72 (BP Location: Right arm, Patient Position: Sitting, Cuff Size: Adult)   Pulse 97   Ht 182.9 cm (72\")   Wt 113 kg (250 lb)   SpO2 97%   BMI 33.91 kg/m²        Constitutional:       Appearance: Healthy appearance. Well-developed.   Eyes:      General: Lids are normal. No scleral icterus.     Conjunctiva/sclera: Conjunctivae normal.   HENT:      Head: Normocephalic and atraumatic.   Neck:      Thyroid: No thyromegaly.      Vascular: No carotid bruit or JVD.   Pulmonary:      Effort: Pulmonary effort is normal.      Breath sounds: Normal breath sounds. No wheezing. No rhonchi. No rales.   Cardiovascular:      Normal rate. Regular rhythm.      Murmurs: There is no murmur.      No gallop.  No rub.   Pulses:     Intact distal pulses.   Edema:     Peripheral edema absent.   Abdominal:      General: There is no distension.      Palpations: Abdomen is soft. There is no abdominal mass.   Musculoskeletal:      Cervical back: Normal range of motion. Skin:     General: Skin is warm and dry.      Findings: No rash.   Neurological:      General: No focal deficit present.      Mental Status: Alert and oriented to person, place, and time.      Gait: Gait is intact.   Psychiatric:         Attention and Perception: Attention normal.         Mood and Affect: Mood normal.         Behavior: Behavior normal.             ECG 12 Lead    Date/Time: 2/23/2024 6:16 PM  Performed " by: Francesco Fair IV, MD    Authorized by: Francesco Fair IV, MD  Comparison: compared with previous ECG from 3/1/2023  Similar to previous ECG          Lab Results   Component Value Date    CHOL 160 08/02/2019    HDL 34 (L) 08/02/2019     (H) 08/02/2019    VLDL 20.8 08/02/2019     Lab Results   Component Value Date    GLUCOSE 87 08/02/2019    BUN 10 08/02/2019    CREATININE 0.94 08/02/2019    BCR 10.6 08/02/2019    K 4.7 08/02/2019    CO2 21.6 (L) 08/02/2019    CALCIUM 9.7 08/02/2019    PROTENTOTREF 7.2 10/06/2016    ALBUMIN 4.50 08/02/2019    BILITOT 0.4 08/02/2019    AST 17 08/02/2019    ALT 20 08/02/2019     Lab Results   Component Value Date    WBC 9.23 08/02/2019    HGB 15.9 08/02/2019    HCT 52.5 (H) 08/02/2019    MCV 92.3 08/02/2019     08/02/2019     Lab Results   Component Value Date    HGBA1C 5.56 08/02/2019            Diagnoses and all orders for this visit:    1. Palpitations (Primary)  Overview:  History of event recorder with no episodes, remote.    Assessment & Plan:  Palpitations symptoms presently quiescent  Repeat echocardiogram to ensure structurally normal heart    Orders:  -     Adult Transthoracic Echo Complete W/ Cont if Necessary Per Protocol; Future  -     ECG 12 Lead    2. Essential hypertension  Overview:  Echocardiogram (7/31/2006): Normal LVEF, no significant valvular abnormality.  Target blood pressure <130/80 mmHg      Assessment & Plan:  Well-controlled today  Continue olmesartan    Orders:  -     olmesartan (BENICAR) 20 MG tablet; Take 1 tablet by mouth Daily.  -     hydroCHLOROthiazide 12.5 MG tablet; Take 1 tablet by mouth Daily.    3. Family history of premature CAD  Overview:  Father with MI/CABG around age 50    Assessment & Plan:  Recommend further restratification with LP(a) and coronary calcium score testing    Orders:  -     Lipoprotein A (LPA); Future  -     CT Cardiac Calcium Score Without Dye; Future  -     LDL Cholesterol, Direct;  Future  -     atorvastatin (LIPITOR) 10 MG tablet; Take 1 tablet by mouth Daily.  -     ECG 12 Lead    Other orders  -     Discontinue: atorvastatin (LIPITOR) 10 MG tablet; Take 1 tablet by mouth Daily.                 Echo  LP(a), direct LDL, CMP today  Coronary calcium score testing  Further recommendations to follow        ESTEBAN Fair MD, FAC, [facility]  Interventional Cardiology  02/23/24  18:20 EST

## 2024-02-23 ENCOUNTER — OFFICE VISIT (OUTPATIENT)
Dept: CARDIOLOGY | Facility: CLINIC | Age: 54
End: 2024-02-23
Payer: COMMERCIAL

## 2024-02-23 VITALS
OXYGEN SATURATION: 97 % | HEIGHT: 72 IN | BODY MASS INDEX: 33.86 KG/M2 | DIASTOLIC BLOOD PRESSURE: 72 MMHG | WEIGHT: 250 LBS | SYSTOLIC BLOOD PRESSURE: 112 MMHG | HEART RATE: 97 BPM

## 2024-02-23 DIAGNOSIS — I10 ESSENTIAL HYPERTENSION: ICD-10-CM

## 2024-02-23 DIAGNOSIS — Z82.49 FAMILY HISTORY OF PREMATURE CAD: ICD-10-CM

## 2024-02-23 DIAGNOSIS — R00.2 PALPITATIONS: Primary | ICD-10-CM

## 2024-02-23 RX ORDER — HYDROCHLOROTHIAZIDE 12.5 MG/1
12.5 TABLET ORAL DAILY
COMMUNITY
Start: 2023-02-22 | End: 2024-02-23 | Stop reason: SDUPTHER

## 2024-02-23 RX ORDER — HYDROCHLOROTHIAZIDE 12.5 MG/1
12.5 TABLET ORAL DAILY
Start: 2024-02-23

## 2024-02-23 RX ORDER — ATORVASTATIN CALCIUM 10 MG/1
10 TABLET, FILM COATED ORAL DAILY
Start: 2024-02-23

## 2024-02-23 RX ORDER — ATORVASTATIN CALCIUM 10 MG/1
10 TABLET, FILM COATED ORAL DAILY
COMMUNITY
Start: 2024-01-23 | End: 2024-02-23 | Stop reason: SDUPTHER

## 2024-02-23 RX ORDER — OLMESARTAN MEDOXOMIL 20 MG/1
20 TABLET ORAL DAILY
Start: 2024-02-23

## 2024-02-23 RX ORDER — ATORVASTATIN CALCIUM 10 MG/1
10 TABLET, FILM COATED ORAL DAILY
Start: 2024-02-23 | End: 2024-02-23 | Stop reason: SDUPTHER

## 2024-02-23 RX ORDER — OLMESARTAN MEDOXOMIL 20 MG/1
20 TABLET ORAL DAILY
COMMUNITY
Start: 2023-02-22 | End: 2024-02-23 | Stop reason: SDUPTHER

## 2024-02-23 NOTE — ASSESSMENT & PLAN NOTE
Palpitations symptoms presently quiescent  Repeat echocardiogram to ensure structurally normal heart

## 2024-02-23 NOTE — LETTER
"February 23, 2024     CONCHITA Rutherford  [address] [place] [address]    Patient: Aleksandra Stock   YOB: 1970   Date of Visit: 2/23/2024     Dear CONCHITA Rutherford:       Thank you for referring Aleksandra Stock to me for evaluation. Below are the relevant portions of my assessment and plan of care.    If you have questions, please do not hesitate to call me. I look forward to following Aleksandra along with you.         Sincerely,        Francesco Fair IV, MD        CC: No Recipients    Francesco Fair IV, MD  02/23/24 1821  Signed  [facility] at [facility]  Cardiology Consultation Note     Aleksandra Stock  1970  Requesting Provider: No ref. provider found  PCP: Latesha Martinez APRN    ID:  Aleksandra Stock is a 53 y.o. female who resides in [place], [place].     REASON FOR CONSULTATION:    Abnormal EKG         Dear Latesha Dorman:    Thank you for referring Cristina Stock back to my office for a reevaluation.  Someone in your office last year referred her back to me for palpitation symptoms which have been evaluated in years past.  She never made that appointment, but states her palpitation symptoms have resolved.    You recently saw her in the office and EKG was performed and was reportedly \"abnormal\".  My office has attempted to obtain this EKG with no success.  EKG performed in your office 3/21/2023 appears unremarkable.  EKG today is normal.    Patient presently has no cardiovascular complaints.    Patient reminds me that her father had bypass surgery in his late 40s.      Past Medical History, Past Surgical History, Family history, Social History, and Medications were all reviewed with the patient today and updated as necessary.       Current Outpatient Medications:   •  atorvastatin (LIPITOR) 10 MG tablet, Take 1 tablet by mouth Daily., Disp: , Rfl:   •  hydroCHLOROthiazide 12.5 MG tablet, Take 1 tablet by mouth " "Daily., Disp: , Rfl:   •  olmesartan (BENICAR) 20 MG tablet, Take 1 tablet by mouth Daily., Disp: , Rfl:     Allergies   Allergen Reactions   • Bisoprolol      DID NOT LIKE IT         Past Medical History:   Diagnosis Date   • Asthma    • Breast injury 08/2021    BRUISED BOTH BREASTS S/P FALL   • GERD (gastroesophageal reflux disease)    • Hypertension 01/01/2006    ECHOCARDIOGRAM 07/31/2006, MPS 07/28/2006   • Obesity    • Peptic ulcer disease 1990       History reviewed. No pertinent surgical history.    Family History   Problem Relation Age of Onset   • No Known Problems Mother    • Heart attack Father 53   • Breast cancer Maternal Grandmother         DX AGE 50's   • Heart attack Paternal Grandmother    • Ovarian cancer Maternal Aunt    • Breast cancer Maternal Aunt 48   • Breast cancer Maternal Aunt 49   • Breast cancer Maternal Aunt 54   • Heart attack Paternal Aunt    • Heart attack Paternal Aunt    • Heart attack Paternal Aunt        Social History     Tobacco Use   • Smoking status: Never   • Smokeless tobacco: Never   Substance Use Topics   • Alcohol use: No       Review of Systems   Cardiovascular: Negative.    Respiratory: Negative.                 /72 (BP Location: Right arm, Patient Position: Sitting, Cuff Size: Adult)   Pulse 97   Ht 182.9 cm (72\")   Wt 113 kg (250 lb)   SpO2 97%   BMI 33.91 kg/m²        Constitutional:       Appearance: Healthy appearance. Well-developed.   Eyes:      General: Lids are normal. No scleral icterus.     Conjunctiva/sclera: Conjunctivae normal.   HENT:      Head: Normocephalic and atraumatic.   Neck:      Thyroid: No thyromegaly.      Vascular: No carotid bruit or JVD.   Pulmonary:      Effort: Pulmonary effort is normal.      Breath sounds: Normal breath sounds. No wheezing. No rhonchi. No rales.   Cardiovascular:      Normal rate. Regular rhythm.      Murmurs: There is no murmur.      No gallop.  No rub.   Pulses:     Intact distal pulses.   Edema:     " Peripheral edema absent.   Abdominal:      General: There is no distension.      Palpations: Abdomen is soft. There is no abdominal mass.   Musculoskeletal:      Cervical back: Normal range of motion. Skin:     General: Skin is warm and dry.      Findings: No rash.   Neurological:      General: No focal deficit present.      Mental Status: Alert and oriented to person, place, and time.      Gait: Gait is intact.   Psychiatric:         Attention and Perception: Attention normal.         Mood and Affect: Mood normal.         Behavior: Behavior normal.             ECG 12 Lead    Date/Time: 2/23/2024 6:16 PM  Performed by: Francesco Fair IV, MD    Authorized by: Francesco Fair IV, MD  Comparison: compared with previous ECG from 3/1/2023  Similar to previous ECG          Lab Results   Component Value Date    CHOL 160 08/02/2019    HDL 34 (L) 08/02/2019     (H) 08/02/2019    VLDL 20.8 08/02/2019     Lab Results   Component Value Date    GLUCOSE 87 08/02/2019    BUN 10 08/02/2019    CREATININE 0.94 08/02/2019    BCR 10.6 08/02/2019    K 4.7 08/02/2019    CO2 21.6 (L) 08/02/2019    CALCIUM 9.7 08/02/2019    PROTENTOTREF 7.2 10/06/2016    ALBUMIN 4.50 08/02/2019    BILITOT 0.4 08/02/2019    AST 17 08/02/2019    ALT 20 08/02/2019     Lab Results   Component Value Date    WBC 9.23 08/02/2019    HGB 15.9 08/02/2019    HCT 52.5 (H) 08/02/2019    MCV 92.3 08/02/2019     08/02/2019     Lab Results   Component Value Date    HGBA1C 5.56 08/02/2019            Diagnoses and all orders for this visit:    1. Palpitations (Primary)  Overview:  History of event recorder with no episodes, remote.    Assessment & Plan:  Palpitations symptoms presently quiescent  Repeat echocardiogram to ensure structurally normal heart    Orders:  -     Adult Transthoracic Echo Complete W/ Cont if Necessary Per Protocol; Future  -     ECG 12 Lead    2. Essential hypertension  Overview:  Echocardiogram (7/31/2006): Normal  LVEF, no significant valvular abnormality.  Target blood pressure <130/80 mmHg      Assessment & Plan:  Well-controlled today  Continue olmesartan    Orders:  -     olmesartan (BENICAR) 20 MG tablet; Take 1 tablet by mouth Daily.  -     hydroCHLOROthiazide 12.5 MG tablet; Take 1 tablet by mouth Daily.    3. Family history of premature CAD  Overview:  Father with MI/CABG around age 50    Assessment & Plan:  Recommend further restratification with LP(a) and coronary calcium score testing    Orders:  -     Lipoprotein A (LPA); Future  -     CT Cardiac Calcium Score Without Dye; Future  -     LDL Cholesterol, Direct; Future  -     atorvastatin (LIPITOR) 10 MG tablet; Take 1 tablet by mouth Daily.  -     ECG 12 Lead    Other orders  -     Discontinue: atorvastatin (LIPITOR) 10 MG tablet; Take 1 tablet by mouth Daily.                 Echo  LP(a), direct LDL, CMP today  Coronary calcium score testing  Further recommendations to follow        ESTEBAN Fair MD, FACC, [facility]  Interventional Cardiology  02/23/24  18:20 EST

## 2024-04-09 ENCOUNTER — LAB (OUTPATIENT)
Dept: LAB | Facility: HOSPITAL | Age: 54
End: 2024-04-09
Payer: COMMERCIAL

## 2024-04-09 ENCOUNTER — CONSULT (OUTPATIENT)
Dept: ONCOLOGY | Facility: CLINIC | Age: 54
End: 2024-04-09
Payer: COMMERCIAL

## 2024-04-09 VITALS
HEIGHT: 71 IN | OXYGEN SATURATION: 97 % | WEIGHT: 250 LBS | SYSTOLIC BLOOD PRESSURE: 131 MMHG | DIASTOLIC BLOOD PRESSURE: 88 MMHG | RESPIRATION RATE: 16 BRPM | HEART RATE: 90 BPM | BODY MASS INDEX: 35 KG/M2 | TEMPERATURE: 97.6 F

## 2024-04-09 DIAGNOSIS — D75.1 POLYCYTHEMIA: Primary | ICD-10-CM

## 2024-04-09 DIAGNOSIS — D75.1 POLYCYTHEMIA: ICD-10-CM

## 2024-04-09 LAB
ALBUMIN SERPL-MCNC: 4.7 G/DL (ref 3.5–5.2)
ALBUMIN/GLOB SERPL: 1.8 G/DL
ALP SERPL-CCNC: 78 U/L (ref 39–117)
ALT SERPL W P-5'-P-CCNC: 43 U/L (ref 1–33)
ANION GAP SERPL CALCULATED.3IONS-SCNC: 11 MMOL/L (ref 5–15)
AST SERPL-CCNC: 24 U/L (ref 1–32)
BASOPHILS # BLD AUTO: 0.03 10*3/MM3 (ref 0–0.2)
BASOPHILS NFR BLD AUTO: 0.5 % (ref 0–1.5)
BILIRUB SERPL-MCNC: 0.4 MG/DL (ref 0–1.2)
BUN SERPL-MCNC: 19 MG/DL (ref 6–20)
BUN/CREAT SERPL: 19.4 (ref 7–25)
CALCIUM SPEC-SCNC: 9.7 MG/DL (ref 8.6–10.5)
CHLORIDE SERPL-SCNC: 103 MMOL/L (ref 98–107)
CO2 SERPL-SCNC: 25 MMOL/L (ref 22–29)
CREAT SERPL-MCNC: 0.98 MG/DL (ref 0.57–1)
DEPRECATED RDW RBC AUTO: 41.7 FL (ref 37–54)
EGFRCR SERPLBLD CKD-EPI 2021: 69.2 ML/MIN/1.73
EOSINOPHIL # BLD AUTO: 0.16 10*3/MM3 (ref 0–0.4)
EOSINOPHIL NFR BLD AUTO: 2.5 % (ref 0.3–6.2)
ERYTHROCYTE [DISTWIDTH] IN BLOOD BY AUTOMATED COUNT: 12.8 % (ref 12.3–15.4)
GLOBULIN UR ELPH-MCNC: 2.6 GM/DL
GLUCOSE SERPL-MCNC: 109 MG/DL (ref 65–99)
HCT VFR BLD AUTO: 48 % (ref 34–46.6)
HGB BLD-MCNC: 15.9 G/DL (ref 12–15.9)
IMM GRANULOCYTES # BLD AUTO: 0.01 10*3/MM3 (ref 0–0.05)
IMM GRANULOCYTES NFR BLD AUTO: 0.2 % (ref 0–0.5)
LYMPHOCYTES # BLD AUTO: 1.91 10*3/MM3 (ref 0.7–3.1)
LYMPHOCYTES NFR BLD AUTO: 30.3 % (ref 19.6–45.3)
MCH RBC QN AUTO: 28.9 PG (ref 26.6–33)
MCHC RBC AUTO-ENTMCNC: 33.1 G/DL (ref 31.5–35.7)
MCV RBC AUTO: 87.1 FL (ref 79–97)
MONOCYTES # BLD AUTO: 0.49 10*3/MM3 (ref 0.1–0.9)
MONOCYTES NFR BLD AUTO: 7.8 % (ref 5–12)
NEUTROPHILS NFR BLD AUTO: 3.71 10*3/MM3 (ref 1.7–7)
NEUTROPHILS NFR BLD AUTO: 58.7 % (ref 42.7–76)
PLATELET # BLD AUTO: 291 10*3/MM3 (ref 140–450)
PMV BLD AUTO: 9.3 FL (ref 6–12)
POTASSIUM SERPL-SCNC: 4.6 MMOL/L (ref 3.5–5.2)
PROT SERPL-MCNC: 7.3 G/DL (ref 6–8.5)
RBC # BLD AUTO: 5.51 10*6/MM3 (ref 3.77–5.28)
SODIUM SERPL-SCNC: 139 MMOL/L (ref 136–145)
WBC NRBC COR # BLD AUTO: 6.31 10*3/MM3 (ref 3.4–10.8)

## 2024-04-09 PROCEDURE — 85025 COMPLETE CBC W/AUTO DIFF WBC: CPT

## 2024-04-09 PROCEDURE — 99204 OFFICE O/P NEW MOD 45 MIN: CPT | Performed by: INTERNAL MEDICINE

## 2024-04-09 PROCEDURE — 36415 COLL VENOUS BLD VENIPUNCTURE: CPT

## 2024-04-09 PROCEDURE — 80053 COMPREHEN METABOLIC PANEL: CPT

## 2024-04-09 PROCEDURE — 82668 ASSAY OF ERYTHROPOIETIN: CPT

## 2024-04-09 NOTE — PROGRESS NOTES
New Patient Office Visit      Date: 2024     Patient Name: Aleksandra Stock  MRN: [id number]  : 1970  Referring Physician: Latesha Martinez    Chief Complaint: Establish care for polycythemia    History of Present Illness: Aleksandra Stock is a pleasant 53 y.o. female with a past medical history of hyperlipidemia, hypertension, obesity, migraines who presents today for evaluation of polycythemia. The patient has been following with her PCP for vertigo-like symptoms since 2023.  She has had blood work checked which has been notable for mild polycythemia range between 16-17 during this timeframe.  Hemoglobin has been trending down since December.  She denies any smoking history or exogenous testosterone use.  Denies any family history of polycythemia.  Is uncertain if she snores as she lives alone.  Has never been worked up for JOSE.  Does note about 100 pound weight gain since COVID.  Other than her lightheadedness and vertigo symptoms, she has no other major concerns or complaints.  Is anxious about the vertigo and lightheadedness as it is affecting her quality of life    Oncology History:    Oncology/Hematology History    No history exists.       Subjective      Review of Systems:     Constitutional: Negative for fevers, chills, or weight loss  Eyes: Negative for blurred vision or discharge         Ear/Nose/Throat: Negative for difficulty swallowing, sore throat, LAD                                                       Respiratory: Negative for cough, SOA, wheezing                                                                                        Cardiovascular: Negative for chest pain or palpitations                                                                  Gastrointestinal: Negative for nausea, vomiting or diarrhea                                                                     Genitourinary: Negative for dysuria or hematuria                                                                                            Musculoskeletal: Negative for any joint pains or muscle aches                                                                        Neurologic: Negative for any weakness, headaches, dizziness                                                                         Hematologic: Negative for any easy bleeding or bruising                                                                                   Psychiatric: Negative for anxiety or depression                             Past Medical History:   Past Medical History:   Diagnosis Date    Asthma     Breast injury 08/2021    BRUISED BOTH BREASTS S/P FALL    GERD (gastroesophageal reflux disease)     Hypertension 01/01/2006    ECHOCARDIOGRAM 07/31/2006, MPS 07/28/2006    Obesity     Peptic ulcer disease 1990       Past Surgical History: History reviewed. No pertinent surgical history.    Family History:   Family History   Problem Relation Age of Onset    No Known Problems Mother     Heart attack Father 53    Breast cancer Maternal Grandmother         DX AGE 50's    Heart attack Paternal Grandmother     Ovarian cancer Maternal Aunt     Breast cancer Maternal Aunt 48    Breast cancer Maternal Aunt 49    Breast cancer Maternal Aunt 54    Heart attack Paternal Aunt     Heart attack Paternal Aunt     Heart attack Paternal Aunt        Social History:   Social History     Socioeconomic History    Marital status: Single   Tobacco Use    Smoking status: Never    Smokeless tobacco: Never   Vaping Use    Vaping status: Never Used   Substance and Sexual Activity    Alcohol use: No    Drug use: No    Sexual activity: Defer       Medications:     Current Outpatient Medications:     atorvastatin (LIPITOR) 10 MG tablet, Take 1 tablet by mouth Daily., Disp: , Rfl:     hydroCHLOROthiazide 12.5 MG tablet, Take 1 tablet by mouth Daily., Disp: , Rfl:     olmesartan (BENICAR) 20 MG tablet, Take 1 tablet by mouth Daily., Disp: , Rfl:  "    Allergies:   Allergies   Allergen Reactions    Bisoprolol Nausea Only and Headache       Objective     Physical Exam:  Vital Signs:   Vitals:    04/09/24 1505   BP: 131/88  Comment: LUE   Pulse: 90   Resp: 16   Temp: 97.6 °F (36.4 °C)   TempSrc: Temporal   SpO2: 97%  Comment: RA   Weight: 113 kg (250 lb)   Height: 179.1 cm (70.5\")   PainSc: 0-No pain     Pain Score    04/09/24 1505   PainSc: 0-No pain     ECOG Performance Status: 0 - Asymptomatic    Constitutional: NAD, ECOG 0  Eyes: PERRLA, scleral anicteric  ENT: No LAD, no thyromegaly  Respiratory: CTAB, no wheezing, rales, rhonchi  Cardiovascular: RRR, no murmurs, pulses 2+ bilaterally  Abdomen: soft, NT/ND, no HSM  Musculoskeletal: strength 5/5 bilaterally, no c/c/e  Neurologic: A&O x 3, CN II-XII intact grossly  Psych: mood and affect congruent, no SI or HI    Results Review:   No visits with results within 2 Week(s) from this visit.   Latest known visit with results is:   Office Visit on 08/02/2019   Component Date Value Ref Range Status    Glucose 08/02/2019 87  65 - 99 mg/dL Final    BUN 08/02/2019 10  6 - 20 mg/dL Final    Creatinine 08/02/2019 0.94  0.57 - 1.00 mg/dL Final    Sodium 08/02/2019 141  136 - 145 mmol/L Final    Potassium 08/02/2019 4.7  3.5 - 5.2 mmol/L Final    Chloride 08/02/2019 105  98 - 107 mmol/L Final    CO2 08/02/2019 21.6 (L)  22.0 - 29.0 mmol/L Final    Calcium 08/02/2019 9.7  8.6 - 10.5 mg/dL Final    Total Protein 08/02/2019 7.0  6.0 - 8.5 g/dL Final    Albumin 08/02/2019 4.50  3.50 - 5.20 g/dL Final    ALT (SGPT) 08/02/2019 20  1 - 33 U/L Final    AST (SGOT) 08/02/2019 17  1 - 32 U/L Final    Alkaline Phosphatase 08/02/2019 54  39 - 117 U/L Final    Total Bilirubin 08/02/2019 0.4  0.2 - 1.2 mg/dL Final    eGFR Non African Amer 08/02/2019 64  >60 mL/min/1.73 Final    Globulin 08/02/2019 2.5  gm/dL Final    A/G Ratio 08/02/2019 1.8  g/dL Final    BUN/Creatinine Ratio 08/02/2019 10.6  7.0 - 25.0 Final    Anion Gap 08/02/2019 " 14.4  5.0 - 15.0 mmol/L Final    Total Cholesterol 08/02/2019 160  0 - 200 mg/dL Final    Triglycerides 08/02/2019 104  0 - 150 mg/dL Final    HDL Cholesterol 08/02/2019 34 (L)  40 - 60 mg/dL Final    LDL Cholesterol  08/02/2019 105 (H)  0 - 100 mg/dL Final    VLDL Cholesterol 08/02/2019 20.8  5 - 40 mg/dL Final    LDL/HDL Ratio 08/02/2019 3.09   Final    TSH 08/02/2019 1.870  0.270 - 4.200 mIU/mL Final    Microalbumin/Creatinine Ratio 08/02/2019   mg/g Final    Unable to calculate    Creatinine, Urine 08/02/2019 157.6  mg/dL Final    Microalbumin, Urine 08/02/2019 <1.2  mg/dL Final    Color, UA 08/02/2019 Yellow  Yellow, Straw Final    Appearance, UA 08/02/2019 Cloudy (A)  Clear Final    pH, UA 08/02/2019 5.5  5.0 - 8.0 Final    Specific Gravity, UA 08/02/2019 1.028  1.005 - 1.030 Final    Glucose, UA 08/02/2019 Negative  Negative Final    Ketones, UA 08/02/2019 Negative  Negative Final    Bilirubin, UA 08/02/2019 Negative  Negative Final    Blood, UA 08/02/2019 Negative  Negative Final    Protein, UA 08/02/2019 Negative  Negative Final    Leuk Esterase, UA 08/02/2019 Trace (A)  Negative Final    Nitrite, UA 08/02/2019 Negative  Negative Final    Urobilinogen, UA 08/02/2019 0.2 E.U./dL  0.2 - 1.0 E.U./dL Final    Hemoglobin A1C 08/02/2019 5.56  4.80 - 5.60 % Final    WBC 08/02/2019 9.23  3.40 - 10.80 10*3/mm3 Final    RBC 08/02/2019 5.69 (H)  3.77 - 5.28 10*6/mm3 Final    Hemoglobin 08/02/2019 15.9  12.0 - 15.9 g/dL Final    Hematocrit 08/02/2019 52.5 (H)  34.0 - 46.6 % Final    MCV 08/02/2019 92.3  79.0 - 97.0 fL Final    MCH 08/02/2019 27.9  26.6 - 33.0 pg Final    MCHC 08/02/2019 30.3 (L)  31.5 - 35.7 g/dL Final    RDW 08/02/2019 13.4  12.3 - 15.4 % Final    RDW-SD 08/02/2019 45.9  37.0 - 54.0 fl Final    MPV 08/02/2019 10.8  6.0 - 12.0 fL Final    Platelets 08/02/2019 285  140 - 450 10*3/mm3 Final    Neutrophil % 08/02/2019 75.1  42.7 - 76.0 % Final    Lymphocyte % 08/02/2019 15.8 (L)  19.6 - 45.3 % Final     Monocyte % 08/02/2019 6.2  5.0 - 12.0 % Final    Eosinophil % 08/02/2019 2.3  0.3 - 6.2 % Final    Basophil % 08/02/2019 0.4  0.0 - 1.5 % Final    Immature Grans % 08/02/2019 0.2  0.0 - 0.5 % Final    Neutrophils, Absolute 08/02/2019 6.93  1.70 - 7.00 10*3/mm3 Final    Lymphocytes, Absolute 08/02/2019 1.46  0.70 - 3.10 10*3/mm3 Final    Monocytes, Absolute 08/02/2019 0.57  0.10 - 0.90 10*3/mm3 Final    Eosinophils, Absolute 08/02/2019 0.21  0.00 - 0.40 10*3/mm3 Final    Basophils, Absolute 08/02/2019 0.04  0.00 - 0.20 10*3/mm3 Final    Immature Grans, Absolute 08/02/2019 0.02  0.00 - 0.05 10*3/mm3 Final    nRBC 08/02/2019 0.0  0.0 - 0.2 /100 WBC Final    RBC, UA 08/02/2019 0-2  None Seen, 0-2 /HPF Final    WBC, UA 08/02/2019 0-2  None Seen, 0-2 /HPF Final    Bacteria, UA 08/02/2019 None Seen  None Seen /HPF Final    Squamous Epithelial Cells, UA 08/02/2019 3-6 (A)  None Seen, 0-2 /HPF Final    Hyaline Casts, UA 08/02/2019 None Seen  None Seen /LPF Final    Calcium Oxalate Crystals, UA 08/02/2019 Small/1+  None Seen /HPF Final    Methodology 08/02/2019 Manual Light Microscopy   Final       No results found.    Assessment / Plan      Assessment/Plan:   1. Polycythemia (Primary)  -Hemoglobin ranging between 16-17 since December 2023  -Unclear etiology although concerns for possible undiagnosed JOSE  -Denies any smoking history or exogenous testosterone use  -Will check JAK2 mutational testing and EPO level today  -     CBC & Differential; Future  -     Comprehensive Metabolic Panel; Future  -     Erythropoietin; Future  -     JAK2 V617F, reflex to CALR + MPL + E12-15; Future           Follow Up:   Follow-up pending lab results     Michael Rocha MD  Hematology and Oncology     Please note that portions of this note may have been completed with a voice recognition program. Efforts were made to edit the dictations, but occasionally words are mistranscribed.

## 2024-04-11 LAB — EPO SERPL-ACNC: 10.7 MIU/ML (ref 2.6–18.5)

## 2024-04-21 LAB
CALR EXON 9 MUT ANL BLD/T: NORMAL
CITATION REF LAB TEST: NORMAL
JAK2 GENE MUT ANL BLD/T: NORMAL
JAK2 P.V617F BLD/T QL: NORMAL
LAB DIRECTOR NAME PROVIDER: NORMAL
MPL GENE MUT TESTED MAR: NORMAL
REF LAB TEST METHOD: NORMAL
REFLEX: NORMAL
TEST PERFORMANCE INFO SPEC: NORMAL

## 2024-06-11 ENCOUNTER — OFFICE VISIT (OUTPATIENT)
Dept: NEUROLOGY | Facility: CLINIC | Age: 54
End: 2024-06-11
Payer: COMMERCIAL

## 2024-06-11 VITALS
SYSTOLIC BLOOD PRESSURE: 134 MMHG | HEIGHT: 71 IN | DIASTOLIC BLOOD PRESSURE: 88 MMHG | WEIGHT: 258.2 LBS | OXYGEN SATURATION: 97 % | BODY MASS INDEX: 36.15 KG/M2 | HEART RATE: 98 BPM

## 2024-06-11 DIAGNOSIS — R51.9 CHRONIC NONINTRACTABLE HEADACHE, UNSPECIFIED HEADACHE TYPE: ICD-10-CM

## 2024-06-11 DIAGNOSIS — R42 DIZZINESS: Primary | ICD-10-CM

## 2024-06-11 DIAGNOSIS — G89.29 CHRONIC NONINTRACTABLE HEADACHE, UNSPECIFIED HEADACHE TYPE: ICD-10-CM

## 2024-06-11 DIAGNOSIS — Z82.49 FAMILY HISTORY OF CEREBRAL ANEURYSM: ICD-10-CM

## 2024-06-11 PROCEDURE — 99204 OFFICE O/P NEW MOD 45 MIN: CPT | Performed by: NURSE PRACTITIONER

## 2024-06-11 NOTE — PROGRESS NOTES
"   Neuro Office Visit      Encounter Date: 2024   Patient Name: Aleksandra Stock  : 1970   MRN: [id number]   PCP: CONCHITA Bonilla  Chief Complaint:    Chief Complaint   Patient presents with    Dizziness       History of Present Illness: Aleksandra Stock is a 53 y.o. female who is here today in Neurology for  dizziness.    Dizziness  2023 onset of  dizziness. The week before Thanksgiving hit her head on left frontal area on car trunk. No LOC or vomiting. One week later a large yankee candle landed on her head. Large hematoma. No LOC. Had clear rhinnorrhea at that time but denies bacterial infection.  Since middle of December has noticed uneasy feeling in her head with any movement. Feels \"swimmy headed\".  Does not occur while seated and still. If she turns her head to the side will feel dizzy. No vertigo. No nausea or vomiting. No palpitations.  Hydrating with only 2 glasses of water a day and taking a diuretic. Sleeping 5-8 hours a night.  Has blurred vision. Eye exam in December was normal. She is supposed to wear glasses while driving.  She does medical billing. Denies cognitive decline or making mistakes at work.   Saw ENT. Hearing eval was nml. Did not have VNG test. Records needed.  No falls.    Headaches  Has had 3 headaches in last 6 weeks in same location of candle trauma on left crown. Short only a few minutes.  No vision change, nausea or vomiting.    Being worked up by cardiology for abnormal EKG and FH of early CAD. Echo ordered and CT cardiac calcium score. Has not had holter or CUS.    Polycythemia  Seen by hematology.Progress Notes by Michael Rocha MD (2024 15:00). Labs drawn. Schedule for sleep study.    High risk for cerebral aneurysm  Pt with multiple Aunts with stroke due to cerebral aneurysm.    PMH: htn, hld, polycythemia, asthma, palpitations, PUD  FH: CAD, breaset cancer, colon cancer, multiple family members with cerebral aneurysms  SH:-tob, " -etoh, -drug  Subjective      Past Medical History:   Past Medical History:   Diagnosis Date    Asthma     Breast injury 08/2021    BRUISED BOTH BREASTS S/P FALL    GERD (gastroesophageal reflux disease)     Hyperlipidemia 02/08/24    Hypertension 01/01/2006    ECHOCARDIOGRAM 07/31/2006, MPS 07/28/2006    Obesity     Peptic ulcer disease 1990       Past Surgical History: History reviewed. No pertinent surgical history.    Family History:   Family History   Problem Relation Age of Onset    No Known Problems Mother     Heart attack Father 53    Breast cancer Maternal Grandmother         DX AGE 50's    Dementia Maternal Grandmother     Stroke Maternal Grandmother     Heart attack Paternal Grandmother     Ovarian cancer Maternal Aunt     Breast cancer Maternal Aunt 48    Breast cancer Maternal Aunt 49    Breast cancer Maternal Aunt 54    Heart attack Paternal Aunt     Heart attack Paternal Aunt     Heart attack Paternal Aunt        Social History:   Social History     Socioeconomic History    Marital status: Single   Tobacco Use    Smoking status: Never    Smokeless tobacco: Never   Vaping Use    Vaping status: Never Used   Substance and Sexual Activity    Alcohol use: Yes     Alcohol/week: 2.0 standard drinks of alcohol     Types: 2 Glasses of wine per week     Comment: month    Drug use: Never    Sexual activity: Not Currently     Partners: Male     Birth control/protection: Condom       Medications:     Current Outpatient Medications:     atorvastatin (LIPITOR) 10 MG tablet, Take 1 tablet by mouth Daily., Disp: , Rfl:     hydroCHLOROthiazide 12.5 MG tablet, Take 1 tablet by mouth Daily., Disp: , Rfl:     olmesartan (BENICAR) 20 MG tablet, Take 1 tablet by mouth Daily., Disp: , Rfl:     Allergies:   Allergies   Allergen Reactions    Bisoprolol Nausea Only and Headache       PHQ-9 Total Score:     STEADI Fall Risk Assessment has not been completed.    Objective     Physical Exam:   Physical Exam  Neurological:       Mental Status: She is oriented to person, place, and time.      Coordination: Finger-Nose-Finger Test, Heel to Shin Test and Romberg Test normal.      Gait: Gait is intact. Tandem walk normal.      Deep Tendon Reflexes:      Reflex Scores:       Tricep reflexes are 2+ on the right side and 2+ on the left side.       Bicep reflexes are 2+ on the right side and 2+ on the left side.       Brachioradialis reflexes are 2+ on the right side and 2+ on the left side.       Patellar reflexes are 2+ on the right side and 2+ on the left side.       Achilles reflexes are 2+ on the right side and 2+ on the left side.  Psychiatric:         Speech: Speech normal.         Neurologic Exam     Mental Status   Oriented to person, place, and time.   Follows 3 step commands.   Attention: normal. Concentration: normal.   Speech: speech is normal   Level of consciousness: alert  Knowledge: consistent with education.   Normal comprehension.     Cranial Nerves     CN III, IV, VI   Right pupil: Accommodation: intact.   Left pupil: Accommodation: intact.   CN III: no CN III palsy  CN VI: no CN VI palsy  Nystagmus: none   Diplopia: none  Upgaze: normal  Downgaze: normal  Conjugate gaze: present    CN VII   Facial expression full, symmetric.     CN VIII   Hearing: intact    CN XII   CN XII normal.     Motor Exam   Muscle bulk: normal  Overall muscle tone: normal    Strength   Right biceps: 5/5  Left biceps: 5/5  Right triceps: 5/5  Left triceps: 5/5  Right interossei: 5/5  Left interossei: 5/5  Right quadriceps: 5/5  Left quadriceps: 5/5  Right anterior tibial: 5/5  Left anterior tibial: 5/5  Right posterior tibial: 5/5  Left posterior tibial: 5/5    Sensory Exam   Light touch normal.     Gait, Coordination, and Reflexes     Gait  Gait: normal    Coordination   Romberg: negative  Finger to nose coordination: normal  Heel to shin coordination: normal  Tandem walking coordination: normal    Tremor   Resting tremor: absent  Action tremor:  "absent    Reflexes   Right brachioradialis: 2+  Left brachioradialis: 2+  Right biceps: 2+  Left biceps: 2+  Right triceps: 2+  Left triceps: 2+  Right patellar: 2+  Left patellar: 2+  Right achilles: 2+  Left achilles: 2+  Right : 2+  Left : 2+       Vital Signs:   Vitals:    06/11/24 1037   BP: 134/88   Pulse: 98   SpO2: 97%   Weight: 117 kg (258 lb 3.2 oz)   Height: 179.1 cm (70.51\")     Body mass index is 36.51 kg/m².         Assessment / Plan      Assessment/Plan:   Diagnoses and all orders for this visit:    1. Dizziness (Primary)  Comments:  Increase water intake to 64 oz/day. Change positons slowly.  Consider vestibuar PT. Encourage patient to complete echo, holter, CUS    2. Chronic nonintractable headache, unspecified headache type  Comments:  Not migrainous.  Orders:  -     MRI Angiogram Head With & Without Contrast; Future  -     MRI Brain With & Without Contrast; Future    3. Family history of cerebral aneurysm  Comments:  MRA for cerebral aneurysm             Patient Education:       Reviewed medications, potential side effects and signs and symptoms to report. Discussed risk versus benefits of treatment plan with patient and/or family-including medications, labs and radiology that may be ordered. Addressed questions and concerns during visit. Patient and/or family verbalized understanding and agree with plan. Instructed to call the office with any questions and report to ER with any life-threatening symptoms.     Follow Up:   Return in about 3 months (around 9/11/2024) for Medical records release.    During this visit the following were done:  Labs Reviewed [x]    Labs Ordered []    Radiology Reports Reviewed []    Radiology Ordered [x]    PCP Records Reviewed [x]    Referring Provider Records Reviewed [x]    ER Records Reviewed [x]    Hospital Records Reviewed []    History Obtained From Family []    Radiology Images Reviewed []    Other Reviewed [x]    Records Requested [x]      Tricia ALEXANDER" Edi, DNP, APRN

## 2024-06-11 NOTE — LETTER
"2024     CONCHITA Rutherford  [address] [facility] [address]    Patient: Aleksandra Stock   YOB: 1970   Date of Visit: 2024     Dear CONCHITA Rutherford:       Thank you for referring Aleksandra Stock to me for evaluation. Below are the relevant portions of my assessment and plan of care.    If you have questions, please do not hesitate to call me. I look forward to following Aleksandra along with you.         Sincerely,        Tricia Daley DNP, APRN        CC: No Recipients    Tricia Daley DNP, APRN  [phone]  Signed     Neuro Office Visit      Encounter Date: 2024   Patient Name: Aleksandra Stock  : 1970   MRN: [id number]   PCP: CONCHITA Bonilla  Chief Complaint:    Chief Complaint   Patient presents with   • Dizziness       History of Present Illness: Aleksandra Stock is a 53 y.o. female who is here today in Neurology for  dizziness.    Dizziness  2023 onset of  dizziness. The week before Thanksgiving hit her head on left frontal area on car trunk. No LOC or vomiting. One week later a large yankee candle landed on her head. Large hematoma. No LOC. Had clear rhinnorrhea at that time but denies bacterial infection.  Since middle of December has noticed uneasy feeling in her head with any movement. Feels \"swimmy headed\".  Does not occur while seated and still. If she turns her head to the side will feel dizzy. No vertigo. No nausea or vomiting. No palpitations.  Hydrating with only 2 glasses of water a day and taking a diuretic. Sleeping 5-8 hours a night.  Has blurred vision. Eye exam in December was normal. She is supposed to wear glasses while driving.  She does medical billing. Denies cognitive decline or making mistakes at work.   Saw ENT. Hearing eval was nml. Did not have VNG test. Records needed.  No falls.    Headaches  Has had 3 headaches in last 6 weeks in same location of candle trauma on left crown. " Short only a few minutes.  No vision change, nausea or vomiting.    Being worked up by cardiology for abnormal EKG and FH of early CAD. Echo ordered and CT cardiac calcium score. Has not had holter or CUS.    Polycythemia  Seen by hematology.Progress Notes by Michael Rocha MD (04/09/2024 15:00). Labs drawn. Schedule for sleep study.    High risk for cerebral aneurysm  Pt with multiple Aunts with stroke due to cerebral aneurysm.    PMH: htn, hld, polycythemia, asthma, palpitations, PUD  FH: CAD, breaset cancer, colon cancer, multiple family members with cerebral aneurysms  SH:-tob, -etoh, -drug  Subjective      Past Medical History:   Past Medical History:   Diagnosis Date   • Asthma    • Breast injury 08/2021    BRUISED BOTH BREASTS S/P FALL   • GERD (gastroesophageal reflux disease)    • Hyperlipidemia 02/08/24   • Hypertension 01/01/2006    ECHOCARDIOGRAM 07/31/2006, MPS 07/28/2006   • Obesity    • Peptic ulcer disease 1990       Past Surgical History: History reviewed. No pertinent surgical history.    Family History:   Family History   Problem Relation Age of Onset   • No Known Problems Mother    • Heart attack Father 53   • Breast cancer Maternal Grandmother         DX AGE 50's   • Dementia Maternal Grandmother    • Stroke Maternal Grandmother    • Heart attack Paternal Grandmother    • Ovarian cancer Maternal Aunt    • Breast cancer Maternal Aunt 48   • Breast cancer Maternal Aunt 49   • Breast cancer Maternal Aunt 54   • Heart attack Paternal Aunt    • Heart attack Paternal Aunt    • Heart attack Paternal Aunt        Social History:   Social History     Socioeconomic History   • Marital status: Single   Tobacco Use   • Smoking status: Never   • Smokeless tobacco: Never   Vaping Use   • Vaping status: Never Used   Substance and Sexual Activity   • Alcohol use: Yes     Alcohol/week: 2.0 standard drinks of alcohol     Types: 2 Glasses of wine per week     Comment: month   • Drug use: Never   • Sexual  activity: Not Currently     Partners: Male     Birth control/protection: Condom       Medications:     Current Outpatient Medications:   •  atorvastatin (LIPITOR) 10 MG tablet, Take 1 tablet by mouth Daily., Disp: , Rfl:   •  hydroCHLOROthiazide 12.5 MG tablet, Take 1 tablet by mouth Daily., Disp: , Rfl:   •  olmesartan (BENICAR) 20 MG tablet, Take 1 tablet by mouth Daily., Disp: , Rfl:     Allergies:   Allergies   Allergen Reactions   • Bisoprolol Nausea Only and Headache       PHQ-9 Total Score:     [facility] Fall Risk Assessment has not been completed.    Objective     Physical Exam:   Physical Exam  Neurological:      Mental Status: She is oriented to person, place, and time.      Coordination: Finger-Nose-Finger Test, Heel to Shin Test and Romberg Test normal.      Gait: Gait is intact. Tandem walk normal.      Deep Tendon Reflexes:      Reflex Scores:       Tricep reflexes are 2+ on the right side and 2+ on the left side.       Bicep reflexes are 2+ on the right side and 2+ on the left side.       Brachioradialis reflexes are 2+ on the right side and 2+ on the left side.       Patellar reflexes are 2+ on the right side and 2+ on the left side.       Achilles reflexes are 2+ on the right side and 2+ on the left side.  Psychiatric:         Speech: Speech normal.         Neurologic Exam     Mental Status   Oriented to person, place, and time.   Follows 3 step commands.   Attention: normal. Concentration: normal.   Speech: speech is normal   Level of consciousness: alert  Knowledge: consistent with education.   Normal comprehension.     Cranial Nerves     CN III, IV, VI   Right pupil: Accommodation: intact.   Left pupil: Accommodation: intact.   CN III: no CN III palsy  CN VI: no CN VI palsy  Nystagmus: none   Diplopia: none  Upgaze: normal  Downgaze: normal  Conjugate gaze: present    CN VII   Facial expression full, symmetric.     CN VIII   Hearing: intact    CN XII   CN XII normal.     Motor Exam   Muscle bulk:  "normal  Overall muscle tone: normal    Strength   Right biceps: 5/5  Left biceps: 5/5  Right triceps: 5/5  Left triceps: 5/5  Right interossei: 5/5  Left interossei: 5/5  Right quadriceps: 5/5  Left quadriceps: 5/5  Right anterior tibial: 5/5  Left anterior tibial: 5/5  Right posterior tibial: 5/5  Left posterior tibial: 5/5    Sensory Exam   Light touch normal.     Gait, Coordination, and Reflexes     Gait  Gait: normal    Coordination   Romberg: negative  Finger to nose coordination: normal  Heel to shin coordination: normal  Tandem walking coordination: normal    Tremor   Resting tremor: absent  Action tremor: absent    Reflexes   Right brachioradialis: 2+  Left brachioradialis: 2+  Right biceps: 2+  Left biceps: 2+  Right triceps: 2+  Left triceps: 2+  Right patellar: 2+  Left patellar: 2+  Right achilles: 2+  Left achilles: 2+  Right : 2+  Left : 2+       Vital Signs:   Vitals:    06/11/24 1037   BP: 134/88   Pulse: 98   SpO2: 97%   Weight: 117 kg (258 lb 3.2 oz)   Height: 179.1 cm (70.51\")     Body mass index is 36.51 kg/m².         Assessment / Plan      Assessment/Plan:   Diagnoses and all orders for this visit:    1. Dizziness (Primary)  Comments:  Increase water intake to 64 oz/day. Change positons slowly.  Consider vestibuar PT. Encourage patient to complete echo, holter, CUS    2. Chronic nonintractable headache, unspecified headache type  Comments:  Not migrainous.  Orders:  -     MRI Angiogram Head With & Without Contrast; Future  -     MRI Brain With & Without Contrast; Future    3. Family history of cerebral aneurysm  Comments:  MRA for cerebral aneurysm             Patient Education:       Reviewed medications, potential side effects and signs and symptoms to report. Discussed risk versus benefits of treatment plan with patient and/or family-including medications, labs and radiology that may be ordered. Addressed questions and concerns during visit. Patient and/or family verbalized " understanding and agree with plan. Instructed to call the office with any questions and report to ER with any life-threatening symptoms.     Follow Up:   Return in about 3 months (around 9/11/2024) for Medical records release.    During this visit the following were done:  Labs Reviewed [x]    Labs Ordered []    Radiology Reports Reviewed []    Radiology Ordered [x]    PCP Records Reviewed [x]    Referring Provider Records Reviewed [x]    ER Records Reviewed [x]    Hospital Records Reviewed []    History Obtained From Family []    Radiology Images Reviewed []    Other Reviewed [x]    Records Requested [x]      Tricia Daley, DNP, APRN

## 2024-07-01 ENCOUNTER — HOSPITAL ENCOUNTER (OUTPATIENT)
Dept: MRI IMAGING | Facility: HOSPITAL | Age: 54
Discharge: HOME | End: 2024-07-01
Payer: COMMERCIAL

## 2024-07-01 DIAGNOSIS — R51.9 CHRONIC NONINTRACTABLE HEADACHE, UNSPECIFIED HEADACHE TYPE: ICD-10-CM

## 2024-07-01 DIAGNOSIS — G89.29 CHRONIC NONINTRACTABLE HEADACHE, UNSPECIFIED HEADACHE TYPE: ICD-10-CM

## 2024-07-01 PROCEDURE — 70546 MR ANGIOGRAPH HEAD W/O&W/DYE: CPT

## 2024-07-01 PROCEDURE — 70553 MRI BRAIN STEM W/O & W/DYE: CPT

## 2024-07-01 PROCEDURE — A9577 INJ MULTIHANCE: HCPCS | Performed by: NURSE PRACTITIONER

## 2024-07-01 PROCEDURE — 0 GADOBENATE DIMEGLUMINE 529 MG/ML SOLUTION: Performed by: NURSE PRACTITIONER

## 2024-07-01 RX ADMIN — GADOBENATE DIMEGLUMINE 20 ML: 529 INJECTION, SOLUTION INTRAVENOUS at 14:01

## 2024-07-02 ENCOUNTER — TELEPHONE (OUTPATIENT)
Dept: NEUROLOGY | Facility: CLINIC | Age: 54
End: 2024-07-02
Payer: COMMERCIAL

## 2024-07-02 NOTE — TELEPHONE ENCOUNTER
----- Message from Tricia Daley sent at 7/2/2024  1:22 PM EDT -----  Please notify pt that MRA and MRI of brain is normal with no concerning findings.

## 2024-07-02 NOTE — TELEPHONE ENCOUNTER
----- Message from Tricia Daley sent at 7/2/2024  1:20 PM EDT -----  Please notify pt that MRI of brain and MRA is normal with no concerning findings.

## 2024-07-16 ENCOUNTER — TELEPHONE (OUTPATIENT)
Dept: NEUROLOGY | Facility: CLINIC | Age: 54
End: 2024-07-16
Payer: COMMERCIAL

## 2024-07-16 NOTE — TELEPHONE ENCOUNTER
"Patient called and is very upset that she was sent a MyChart message instead of being called.  She is upset that she feels she is being pushed aside with no follow up.  Patient is having light headedness since December with any movement on both sides.  \"Swim head that never stops.\"  Patient states she cannot drive.  She is concerned about the note saying sinusitis.  Patient stated has already been to ENT.  Patient stated she was not ordered the CUS and has not heard about appointments.  Told patient that I will send concerns to provider.  "

## 2024-07-16 NOTE — TELEPHONE ENCOUNTER
"Caller: Aleksandra Stock \"Cristina\"    Relationship: Self    Best call back number: [phone]    What test was performed: MRA HEAD AND MRI BRAIN    When was the test performed: 7/1/2024    Where was the test performed: [facility]    Additional notes: PT CALLING FOR MRI & MRA RESULTS. SHE STATES SHE WOULD PREFER CLINICAL STAFF MEMBER REACH OUT TO HER WITH HER TEST RESULTS AS OPPOSED TO RECEIVING THE RESULTS VIA [employer] MESSAGE.    PLEASE REVIEW AND ADVISE.  "

## 2024-07-18 NOTE — TELEPHONE ENCOUNTER
I called pt again today. Third try to discuss her concerns. No answer. Left message for patient to contact me if she wants to talk.

## 2025-03-04 ENCOUNTER — TRANSCRIBE ORDERS (OUTPATIENT)
Dept: ADMINISTRATIVE | Facility: HOSPITAL | Age: 55
End: 2025-03-04
Payer: COMMERCIAL

## 2025-03-04 DIAGNOSIS — Z12.31 OTHER SCREENING MAMMOGRAM: Primary | ICD-10-CM

## 2025-03-26 ENCOUNTER — HOSPITAL ENCOUNTER (OUTPATIENT)
Dept: MAMMOGRAPHY | Facility: HOSPITAL | Age: 55
Discharge: HOME | End: 2025-03-26
Admitting: NURSE PRACTITIONER
Payer: COMMERCIAL

## 2025-03-26 DIAGNOSIS — Z12.31 OTHER SCREENING MAMMOGRAM: ICD-10-CM

## 2025-03-26 PROCEDURE — 77067 SCR MAMMO BI INCL CAD: CPT

## 2025-03-26 PROCEDURE — 77063 BREAST TOMOSYNTHESIS BI: CPT
